# Patient Record
Sex: FEMALE | Race: OTHER | URBAN - METROPOLITAN AREA
[De-identification: names, ages, dates, MRNs, and addresses within clinical notes are randomized per-mention and may not be internally consistent; named-entity substitution may affect disease eponyms.]

---

## 2024-09-16 ENCOUNTER — OFFICE VISIT (OUTPATIENT)
Dept: OBGYN CLINIC | Facility: CLINIC | Age: 29
End: 2024-09-16
Payer: COMMERCIAL

## 2024-09-16 ENCOUNTER — ULTRASOUND ENCOUNTER (OUTPATIENT)
Dept: OBGYN CLINIC | Facility: CLINIC | Age: 29
End: 2024-09-16
Payer: COMMERCIAL

## 2024-09-16 VITALS
BODY MASS INDEX: 36.96 KG/M2 | HEIGHT: 66 IN | DIASTOLIC BLOOD PRESSURE: 86 MMHG | SYSTOLIC BLOOD PRESSURE: 122 MMHG | WEIGHT: 230 LBS

## 2024-09-16 DIAGNOSIS — N92.6 MISSED PERIOD: ICD-10-CM

## 2024-09-16 DIAGNOSIS — N92.6 MISSED PERIOD: Primary | ICD-10-CM

## 2024-09-16 LAB
CONTROL LINE PRESENT WITH A CLEAR BACKGROUND (YES/NO): YES YES/NO
KIT LOT #: NORMAL NUMERIC
PREGNANCY TEST, URINE: POSITIVE

## 2024-09-16 NOTE — PROGRESS NOTES
[facility]  Obstetrics and Gynecology    Chief Complaint:   Chief Complaint   Patient presents with    Pregnancy       Subjective:     Mariana Fierro is a 29 year old ,female, Patient's last menstrual period was 2024 (exact date). presents with missed menses and positive pregnancy test.   This is an unexpected but desired pregnancy. Partner is involved.    Confirmation of pregnancy, surprise but desired  Jarrell andujar    Patient's last menstrual period was 2024 (exact date).  Monthly / regular, this was normal period    + nausea if gets hungry, vomits in the evening  + tired    + pnv    + urinary frequency  No problems with BM  + breast tenderness    Last pap smear  normal, no h/o abnormals  No h/o STI    Menarche: 12 yrs old (2024 10:12 AM)  Period Cycle (Days): every month (2024 10:12 AM)  Period Duration (Days): 5 days (2024 10:12 AM)  Period Flow: heavy first day then tapers off (2024 10:12 AM)  Use of Birth Control (if yes, specify type): OCP (2024 10:12 AM)  Date When Birth Control Last Used: last used 2 yrs ago (2024 10:12 AM)  Hx Prior Abnormal Pap: No (2024 10:12 AM)  Pap Date:  (pt states ) (2024 10:12 AM)  Pap Result Notes: WNL (2024 10:12 AM)      Review of Systems:  General: no complaints  Eyes: no complaints  Respiratory: no complaints  Cardiovascular: no complaints  GI: no complaints  : no complaints See HPI  Hematological/lymphatic: no complaints  Breast: no complaints  Psychiatric: no complaints  Endocrine:no complaints  Neurological: no complaints  Immunological: no complaints  Musculoskeletal:no complaints    OB History    Para Term  AB Living   1 0 0 0 0 0   SAB IAB Ectopic Multiple Live Births   0 0 0 0 0       Past Medical History:    Patient denies medical problems       Past Surgical History:   Procedure Laterality Date    Patient denies any surgical history         Social History      Socioeconomic History    Marital status: Single   Tobacco Use    Smoking status: Never    Smokeless tobacco: Never   Substance and Sexual Activity    Alcohol use: Not Currently    Drug use: Never    Sexual activity: Yes   Other Topics Concern    Blood Transfusions No       Family History   Problem Relation Age of Onset    High Cholesterol Father     Hypertension Father     High Cholesterol Mother     Hypertension Mother     Cancer Maternal Grandmother     Thyroid Cancer Paternal Grandmother     Diabetes Paternal Grandfather     No Known Problems Brother     No Known Problems Brother     No Known Problems Sister        No current outpatient medications on file.      Health maintenance:  Health Maintenance   Topic Date Due    Annual Physical  Never done    DTaP,Tdap,and Td Vaccines (1 - Tdap) Never done    Pap Smear  Never done    COVID-19 Vaccine (2 -  season) 2024    Influenza Vaccine (1) 10/01/2024    Annual Depression Screening  Completed    Pneumococcal Vaccine: Birth to 64yrs  Aged Out        Objective:     Vitals:    24 1007   BP: 122/86   Weight: 230 lb (104.3 kg)   Height: 66\"         Body mass index is 37.12 kg/m².    GENERAL: well developed, well nourished, in no apparent distress, alert and orientated X 3  PSYCH: mood and affect stable   SKIN: no rashes, no lesions  HEENT: normal  LUNGS: respiration unlabored  CARDIOVASCULAR: no peripheral edema or varicosities, skin warm and dry  ABDOMEN: Soft, non distended; non tender, no masses  Unable to hear FHT on handheld doppler  EXTREMITIES:  Nontender without edema    Labs:  POC urine pregnancy test: Positive           Assessment:     Mariana Fierro is a 29 year old  female who presents for missed menses and positive pregnancy test    There is no problem list on file for this patient.        Plan:       ICD-10-CM    1. Missed period  N92.6 Urine Preg Test [21351]     US OB 1st Trimester Abdominal <14 wks [89795]        Missed menses  -  positive pregnancy test  - US ordered   - Pt counseled on safety, diet, exercise, caffiene, tobacco, ETOH, sexual activity, ER precautions, diet, exercise, appropriate otc medication use, substance abuse   - Counseled on taking a PNV with folic acid   - genetic screening testing options reviewed.  - advised to follow up to establish prenatal care - referred for RN education visit  - SAB precautions provided   - d/w nausea and vomiting in pregnancy including vitamin B 6 and unisom   - flu/covid vaccines    All of the findings and plan were discussed with the patient.  She notes understanding and agrees with the plan of care.  All questions were answered to the best of my ability at this time.      RTC in 1-2 weeks for rn education appointment, or sooner if needed     DO LEN Byrnes

## 2024-09-17 ENCOUNTER — PATIENT MESSAGE (OUTPATIENT)
Dept: OBGYN CLINIC | Facility: CLINIC | Age: 29
End: 2024-09-17

## 2024-09-17 NOTE — TELEPHONE ENCOUNTER
From: Mariana Fierro  To: Ifrah Cisneros  Sent: 9/17/2024 7:51 AM CDT  Subject: Appointments needed    Hi Dr. Cisneros, hope you are doing well. I am just reaching out because I was wondering if I need to schedule any other appointment? I currently have the Nurse Seminar scheduled for September 30th as well as the 20 week anatomy scan scheduled for November 18th. Is that all that’s needed to be scheduled right now or do I need to schedule another doctors appointment?

## 2024-09-30 ENCOUNTER — NURSE ONLY (OUTPATIENT)
Dept: OBGYN CLINIC | Facility: CLINIC | Age: 29
End: 2024-09-30
Payer: COMMERCIAL

## 2024-09-30 ENCOUNTER — LAB ENCOUNTER (OUTPATIENT)
Dept: LAB | Facility: REFERENCE LAB | Age: 29
End: 2024-09-30
Attending: OBSTETRICS & GYNECOLOGY
Payer: COMMERCIAL

## 2024-09-30 DIAGNOSIS — Z34.01 ENCOUNTER FOR SUPERVISION OF NORMAL FIRST PREGNANCY IN FIRST TRIMESTER (HCC): ICD-10-CM

## 2024-09-30 DIAGNOSIS — Z34.01 ENCOUNTER FOR SUPERVISION OF NORMAL FIRST PREGNANCY IN FIRST TRIMESTER (HCC): Primary | ICD-10-CM

## 2024-09-30 LAB
ANTIBODY SCREEN: NEGATIVE
BASOPHILS # BLD AUTO: 0.03 X10(3) UL (ref 0–0.2)
BASOPHILS NFR BLD AUTO: 0.3 %
DEPRECATED RDW RBC AUTO: 41.6 FL (ref 35.1–46.3)
EOSINOPHIL # BLD AUTO: 0.24 X10(3) UL (ref 0–0.7)
EOSINOPHIL NFR BLD AUTO: 2.6 %
ERYTHROCYTE [DISTWIDTH] IN BLOOD BY AUTOMATED COUNT: 12.4 % (ref 11–15)
GLUCOSE 1H P GLC SERPL-MCNC: 128 MG/DL
HBV SURFACE AG SER-ACNC: <0.1 [IU]/L
HBV SURFACE AG SERPL QL IA: NONREACTIVE
HCT VFR BLD AUTO: 41 %
HGB BLD-MCNC: 14 G/DL
IMM GRANULOCYTES # BLD AUTO: 0.04 X10(3) UL (ref 0–1)
IMM GRANULOCYTES NFR BLD: 0.4 %
LYMPHOCYTES # BLD AUTO: 1.34 X10(3) UL (ref 1–4)
LYMPHOCYTES NFR BLD AUTO: 14.5 %
MCH RBC QN AUTO: 31.3 PG (ref 26–34)
MCHC RBC AUTO-ENTMCNC: 34.1 G/DL (ref 31–37)
MCV RBC AUTO: 91.7 FL
MONOCYTES # BLD AUTO: 0.46 X10(3) UL (ref 0.1–1)
MONOCYTES NFR BLD AUTO: 5 %
NEUTROPHILS # BLD AUTO: 7.12 X10 (3) UL (ref 1.5–7.7)
NEUTROPHILS # BLD AUTO: 7.12 X10(3) UL (ref 1.5–7.7)
NEUTROPHILS NFR BLD AUTO: 77.2 %
PLATELET # BLD AUTO: 226 10(3)UL (ref 150–450)
RBC # BLD AUTO: 4.47 X10(6)UL
RH BLOOD TYPE: POSITIVE
RUBV IGG SER QL: POSITIVE
RUBV IGG SER-ACNC: 18 IU/ML (ref 10–?)
T PALLIDUM AB SER QL IA: NONREACTIVE
WBC # BLD AUTO: 9.2 X10(3) UL (ref 4–11)

## 2024-09-30 PROCEDURE — 86803 HEPATITIS C AB TEST: CPT

## 2024-09-30 PROCEDURE — 36415 COLL VENOUS BLD VENIPUNCTURE: CPT

## 2024-09-30 PROCEDURE — 83020 HEMOGLOBIN ELECTROPHORESIS: CPT

## 2024-09-30 PROCEDURE — 87340 HEPATITIS B SURFACE AG IA: CPT

## 2024-09-30 PROCEDURE — 86762 RUBELLA ANTIBODY: CPT

## 2024-09-30 PROCEDURE — 85025 COMPLETE CBC W/AUTO DIFF WBC: CPT

## 2024-09-30 PROCEDURE — 86901 BLOOD TYPING SEROLOGIC RH(D): CPT

## 2024-09-30 PROCEDURE — 87086 URINE CULTURE/COLONY COUNT: CPT

## 2024-09-30 PROCEDURE — 82950 GLUCOSE TEST: CPT

## 2024-09-30 PROCEDURE — 86900 BLOOD TYPING SEROLOGIC ABO: CPT

## 2024-09-30 PROCEDURE — 86780 TREPONEMA PALLIDUM: CPT

## 2024-09-30 PROCEDURE — 83021 HEMOGLOBIN CHROMOTOGRAPHY: CPT

## 2024-09-30 PROCEDURE — 87389 HIV-1 AG W/HIV-1&-2 AB AG IA: CPT

## 2024-09-30 PROCEDURE — 86850 RBC ANTIBODY SCREEN: CPT

## 2024-09-30 RX ORDER — CHOLECALCIFEROL (VITAMIN D3) 25 MCG
1 TABLET,CHEWABLE ORAL DAILY
COMMUNITY

## 2024-10-01 LAB — HCV AB SERPL QL IA: NONREACTIVE

## 2024-10-02 LAB
HGB A2 MFR BLD: 2.5 % (ref 1.5–3.5)
HGB PNL BLD ELPH: 97.5 % (ref 95.5–100)

## 2024-10-08 ENCOUNTER — TELEPHONE (OUTPATIENT)
Dept: OBGYN CLINIC | Facility: CLINIC | Age: 29
End: 2024-10-08

## 2024-10-17 ENCOUNTER — LAB ENCOUNTER (OUTPATIENT)
Dept: LAB | Age: 29
End: 2024-10-17
Attending: OBSTETRICS & GYNECOLOGY
Payer: COMMERCIAL

## 2024-10-17 ENCOUNTER — ROUTINE PRENATAL (OUTPATIENT)
Dept: OBGYN CLINIC | Facility: CLINIC | Age: 29
End: 2024-10-17
Payer: COMMERCIAL

## 2024-10-17 VITALS
DIASTOLIC BLOOD PRESSURE: 78 MMHG | BODY MASS INDEX: 37.77 KG/M2 | WEIGHT: 235 LBS | SYSTOLIC BLOOD PRESSURE: 116 MMHG | HEIGHT: 66 IN

## 2024-10-17 DIAGNOSIS — Z34.00 SUPERVISION OF NORMAL FIRST PREGNANCY, ANTEPARTUM (HCC): ICD-10-CM

## 2024-10-17 DIAGNOSIS — Z34.00 SUPERVISION OF NORMAL FIRST PREGNANCY, ANTEPARTUM (HCC): Primary | ICD-10-CM

## 2024-10-17 PROCEDURE — 36415 COLL VENOUS BLD VENIPUNCTURE: CPT

## 2024-10-17 PROCEDURE — 82105 ALPHA-FETOPROTEIN SERUM: CPT

## 2024-10-19 LAB
AFP MOM: 1.56
AFP VALUE: 39.5 NG/ML
GA ON COLL DATE: 16 WEEKS
INSULIN DEP AFP: NO
MAT AGE AT EDD: 28.9 YR
MULTIPLE GEST AFP: NO
OSBR RISK 1 IN AFP: 2331
WEIGHT AFP: 235 LBS

## 2024-10-31 ENCOUNTER — TELEPHONE (OUTPATIENT)
Dept: OBGYN CLINIC | Facility: CLINIC | Age: 29
End: 2024-10-31

## 2024-11-20 ENCOUNTER — ROUTINE PRENATAL (OUTPATIENT)
Dept: OBGYN CLINIC | Facility: CLINIC | Age: 29
End: 2024-11-20
Payer: COMMERCIAL

## 2024-11-20 ENCOUNTER — ULTRASOUND ENCOUNTER (OUTPATIENT)
Dept: OBGYN CLINIC | Facility: CLINIC | Age: 29
End: 2024-11-20
Payer: COMMERCIAL

## 2024-11-20 VITALS
WEIGHT: 235.69 LBS | BODY MASS INDEX: 37.88 KG/M2 | SYSTOLIC BLOOD PRESSURE: 108 MMHG | DIASTOLIC BLOOD PRESSURE: 70 MMHG | HEIGHT: 66 IN

## 2024-11-20 DIAGNOSIS — Z34.00 SUPERVISION OF NORMAL FIRST PREGNANCY, ANTEPARTUM (HCC): Primary | ICD-10-CM

## 2024-11-20 DIAGNOSIS — Z34.02 ENCOUNTER FOR SUPERVISION OF NORMAL FIRST PREGNANCY IN SECOND TRIMESTER (HCC): Primary | ICD-10-CM

## 2024-11-20 PROCEDURE — 76805 OB US >/= 14 WKS SNGL FETUS: CPT | Performed by: OBSTETRICS & GYNECOLOGY

## 2024-11-20 RX ORDER — ASPIRIN 81 MG/1
81 TABLET ORAL DAILY
COMMUNITY

## 2024-11-20 NOTE — PROGRESS NOTES
RETURN OB VISIT    Mariana Fierro is a 29 year old  at 20w6d presenting for return OB visit. Today she reports no cramping, bleeding or abnormal discharge. She does reports some hypertrophy of her gums along her bottom front teeth, sometimes this area bleeds with brushing but is not painful. Has not seen her dentist recently.     Normal anatomy US reviewed  Plan for 1hr GTT at next visit  Flu shot - declines today, states she has never had a flu shot. Discussed ACOG recommendation for annual flu vaccination, discussed increased risk of serious illness and hospitalization. Patient will think about it for next visit.   Discussed gingival disease is common in pregnancy, recommend seeing her dentist if bleeding persists or gums cause discomfort.     Follow up in 4wk    Luz Marina Valentine,    Writer contacted Dr. Mikki Pérez to inform of 30 day readmission risk. Writer's attempt to contact Dr. Mikki Pérez was unsuccessful.

## 2024-11-29 ENCOUNTER — HOSPITAL ENCOUNTER (OUTPATIENT)
Facility: HOSPITAL | Age: 29
Discharge: HOME | End: 2024-11-29
Attending: OBSTETRICS & GYNECOLOGY | Admitting: OBSTETRICS & GYNECOLOGY
Payer: COMMERCIAL

## 2024-11-29 ENCOUNTER — TELEPHONE (OUTPATIENT)
Dept: OBGYN CLINIC | Facility: CLINIC | Age: 29
End: 2024-11-29

## 2024-11-29 ENCOUNTER — PATIENT MESSAGE (OUTPATIENT)
Dept: OBGYN CLINIC | Facility: CLINIC | Age: 29
End: 2024-11-29

## 2024-11-29 VITALS — HEART RATE: 80 BPM | SYSTOLIC BLOOD PRESSURE: 125 MMHG | DIASTOLIC BLOOD PRESSURE: 78 MMHG

## 2024-11-29 LAB
BILIRUB UR QL: NEGATIVE
CLARITY UR: CLEAR
COLOR UR: COLORLESS
GLUCOSE UR-MCNC: NORMAL MG/DL
HGB UR QL STRIP.AUTO: NEGATIVE
KETONES UR-MCNC: NEGATIVE MG/DL
LEUKOCYTE ESTERASE UR QL STRIP.AUTO: NEGATIVE
NITRITE UR QL STRIP.AUTO: NEGATIVE
PH UR: 7 [PH] (ref 5–8)
PROT UR-MCNC: NEGATIVE MG/DL
SP GR UR STRIP: 1.01 (ref 1–1.03)
UROBILINOGEN UR STRIP-ACNC: NORMAL

## 2024-11-29 PROCEDURE — 99204 OFFICE O/P NEW MOD 45 MIN: CPT

## 2024-11-29 PROCEDURE — 81003 URINALYSIS AUTO W/O SCOPE: CPT | Performed by: OBSTETRICS & GYNECOLOGY

## 2024-11-29 NOTE — TRIAGE
[facility]  part of [facility]      Triage Note    Mariana Fierro Patient Status:  Outpatient    1995 MRN [id number]   Location [facility] Attending Maribel Carias MD   Hosp Day # 0 PCP No primary care provider on file.      Para:   Estimated Date of Delivery: 4/3/25  Gestation: 22w1d    Chief Complaint    R/o Rom         Allergies:  Allergies[1]    Orders Placed This Encounter   Procedures    Urinalysis with Culture Reflex    POCT Ferning       Lab Results   Component Value Date    WBC 9.2 2024    HGB 14.0 2024    HCT 41.0 2024    .0 2024       Clinitek UA  Lab Results   Component Value Date    URINECUL No Growth at 18-24 hrs. 2024       UA  No results found for: \"COLORUR\", \"CLARITY\", \"SPECGRAVITY\", \"PROUR\", \"GLUUR\", \"KETUR\", \"BILUR\", \"BLOODURINE\", \"NITRITE\", \"UROBILINOGEN\", \"LEUUR\", \"UASA\"    Vitals:    24 1330   BP: 125/78   Pulse: 80       NST                                       Baseline: 150 BPM (doppler)                                                                                                                 Additional Comments Comments: Pt is , 22.1 weeks, presenting with report of possible LOF while voiding around 1200. Pt denies LOF or vaginal bleeding. Ferning and amniotest negative, no fluid noted on SSE. UA sent. Dr Carias reviewed case, discharge order received. Pt instructed on PTL precautions. Pt denies questions at this time. Pt home ambulatory.     Chief Complaint   Patient presents with    R/o Rom     Pt reports \"a lot of fluid coming out\" when she was voiding around 1200 today.         Ellen MELARA RN  2024 2:02 PM         [1]   Allergies  Allergen Reactions    Seasonal SHORTNESS OF BREATH and WHEEZING

## 2024-11-29 NOTE — TELEPHONE ENCOUNTER
Received a call from patient to notified that when she used the bathroom this morning ,  she noticed a clear liquid coming out instead of urine.  Patient is requesting to be seen today or speak with a nurse .    Please assist .

## 2024-11-29 NOTE — PROGRESS NOTES
Pt is a 29 year old female admitted to [facility].     Chief Complaint   Patient presents with    R/o Rom     Pt reports \"a lot of fluid coming out\" when she was voiding around 1200 today.      Pt is  22w1d intra-uterine pregnancy.  History obtained, consents signed. Oriented to room, staff, and plan of care.

## 2024-11-29 NOTE — TELEPHONE ENCOUNTER
Patient is calling again for update, per patient wants to know where should she go to be check out if ED or a different department. Please follow up with patient.

## 2024-11-29 NOTE — TELEPHONE ENCOUNTER
Called pt unsure if leaking bag of water or urine believes it is not urine and occurred for a while, denies cramping/contx and vaginal bleeding.  Advised to go to [facility], agrees.    Called [facility] CADE informed.    Dr. Carias informed.

## 2024-12-18 ENCOUNTER — PATIENT MESSAGE (OUTPATIENT)
Dept: OBGYN CLINIC | Facility: CLINIC | Age: 29
End: 2024-12-18

## 2024-12-18 ENCOUNTER — LAB ENCOUNTER (OUTPATIENT)
Dept: LAB | Age: 29
End: 2024-12-18
Attending: OBSTETRICS & GYNECOLOGY
Payer: COMMERCIAL

## 2024-12-18 ENCOUNTER — ROUTINE PRENATAL (OUTPATIENT)
Dept: OBGYN CLINIC | Facility: CLINIC | Age: 29
End: 2024-12-18
Payer: COMMERCIAL

## 2024-12-18 VITALS
WEIGHT: 241.19 LBS | SYSTOLIC BLOOD PRESSURE: 112 MMHG | BODY MASS INDEX: 38.76 KG/M2 | HEIGHT: 66 IN | DIASTOLIC BLOOD PRESSURE: 72 MMHG

## 2024-12-18 DIAGNOSIS — O99.810 ABNORMAL MATERNAL GLUCOSE TOLERANCE, ANTEPARTUM (HCC): Primary | ICD-10-CM

## 2024-12-18 DIAGNOSIS — Z34.02 ENCOUNTER FOR SUPERVISION OF NORMAL FIRST PREGNANCY IN SECOND TRIMESTER (HCC): Primary | ICD-10-CM

## 2024-12-18 DIAGNOSIS — Z34.00 SUPERVISION OF NORMAL FIRST PREGNANCY, ANTEPARTUM (HCC): ICD-10-CM

## 2024-12-18 LAB — GLUCOSE 1H P GLC SERPL-MCNC: 150 MG/DL

## 2024-12-18 PROCEDURE — 36415 COLL VENOUS BLD VENIPUNCTURE: CPT

## 2024-12-18 PROCEDURE — 82950 GLUCOSE TEST: CPT

## 2024-12-18 NOTE — PROGRESS NOTES
Test results viewed by patient via my chart.    Seen by patient Mariana Fierro on 12/18/2024  1:29 PM

## 2024-12-18 NOTE — PROGRESS NOTES
Doing well. No OB complaints. +FM.   Reviewed questions.   Reviewed Baby mindfulness at 28 weeks.   1h GTT today.     MIGUEL ANGEL 2 weeks.     Dr. Armando Frazier MD    [facility]     This note was created by Dragon voice recognition. Errors in content may be related to improper recognition by the system; efforts to review and correct have been done but errors may still exist. Please be advised the primary purpose of this note is for me to communicate medical care. Standard sentence structure is not always used. Medical terminology and medical abbreviations may be used. There may be grammatical, typographical, and automated fill ins that may have errors missed in proofreading.

## 2024-12-21 ENCOUNTER — LABORATORY ENCOUNTER (OUTPATIENT)
Dept: LAB | Age: 29
End: 2024-12-21
Attending: OBSTETRICS & GYNECOLOGY
Payer: COMMERCIAL

## 2024-12-21 DIAGNOSIS — O99.810 ABNORMAL MATERNAL GLUCOSE TOLERANCE, ANTEPARTUM (HCC): ICD-10-CM

## 2024-12-21 LAB
GLUCOSE 1H P GLC SERPL-MCNC: 163 MG/DL
GLUCOSE 2H P GLC SERPL-MCNC: 143 MG/DL
GLUCOSE 3H P GLC SERPL-MCNC: 77 MG/DL (ref 70–140)
GLUCOSE P FAST SERPL-MCNC: 90 MG/DL

## 2024-12-21 PROCEDURE — 82951 GLUCOSE TOLERANCE TEST (GTT): CPT

## 2024-12-21 PROCEDURE — 36415 COLL VENOUS BLD VENIPUNCTURE: CPT

## 2024-12-21 PROCEDURE — 82952 GTT-ADDED SAMPLES: CPT

## 2024-12-23 PROBLEM — O99.210 OBESITY AFFECTING PREGNANCY, ANTEPARTUM (HCC): Status: ACTIVE | Noted: 2024-12-23

## 2025-01-09 ENCOUNTER — ROUTINE PRENATAL (OUTPATIENT)
Dept: OBGYN CLINIC | Facility: CLINIC | Age: 30
End: 2025-01-09
Payer: COMMERCIAL

## 2025-01-09 VITALS
WEIGHT: 240.63 LBS | SYSTOLIC BLOOD PRESSURE: 116 MMHG | HEIGHT: 66 IN | DIASTOLIC BLOOD PRESSURE: 74 MMHG | BODY MASS INDEX: 38.67 KG/M2

## 2025-01-09 DIAGNOSIS — O99.210 OBESITY AFFECTING PREGNANCY, ANTEPARTUM, UNSPECIFIED OBESITY TYPE (HCC): ICD-10-CM

## 2025-01-09 DIAGNOSIS — Z34.00 SUPERVISION OF NORMAL FIRST PREGNANCY, ANTEPARTUM (HCC): Primary | ICD-10-CM

## 2025-01-09 PROCEDURE — 90715 TDAP VACCINE 7 YRS/> IM: CPT | Performed by: OBSTETRICS & GYNECOLOGY

## 2025-01-09 PROCEDURE — 90471 IMMUNIZATION ADMIN: CPT | Performed by: OBSTETRICS & GYNECOLOGY

## 2025-01-09 NOTE — PATIENT INSTRUCTIONS
Patient education: Should I have my baby circumcised? (The Basics)  Written by the doctors and editors at [facility]  Please read the Disclaimer at the end of this page.  What is circumcision?   ?ir??m?i?io? is surgery to remove the skin that covers the tip of the penis, called the \"foreskin\" (figure 1). ?ir??m?i?i?n is usually done when a baby boy is between 1 and 10 days old. It is more common in some parts of the world than others. ?ir??m?i?i?? is also a common tradition in some religions.  Should I have my baby circumcised?   There is no easy answer. Different people feel differently about this decision.  ?ir??m?ision has some benefits. But it also has some risks. After talking with your baby's doctor, you will have to decide for yourself what is right for your family.  What are the benefits of circumcision?   Circumcised boys seem to have slightly lower rates of:  ?Urinary tract infections  ?Swelling of the opening at the tip of the penis  As adults, circumcised males seem to have slightly lower rates of:  ?Urinary tract infections  ?Swelling of the opening at the tip of the penis  ?Penis cancer  ?HIV and other infections you can catch during sex  ?Cervical cancer in their sex partners  Even so, the risks of these problems are small, even in people who have not been circumcised. Also, people who are not circumcised can lower these extra risks by:  ?Cleaning their penis well  ?Using condoms during sex  What are the risks of circumcision?   Risks include:  ?Bleeding or infection from the surgery  ?Damage to the penis  ?A chance that the doctor will cut off too much or not enough of the foreskin  ?A chance that sex won't feel as good later in life  Only about 1 out of every 200 circumcisions leads to problems. Also, some health insurances won't pay for ?ir??m?jw?n.  How is circumcision done in babies?   The baby will get medicine so they don't feel pain. This might be a cream on their skin or a shot into the base  of their penis. The doctor will clean the baby's penis well. Then, they will use special tools to cut off the foreskin. The doctor will wrap a gauze bandage around the baby's penis.  If you have your baby circumcised, the doctor or nurse will give you instructions on how to care for the baby after surgery. Follow those instructions carefully.  More on this topic   Patient education: Circumcision in babies (The Basics)  Patient education: Care of the uncircumcised penis in babies and children (The Basics)  Patient education: Circumcision in baby boys (Beyond the Basics)  All topics are updated as new evidence becomes available and our peer review process is complete.  This topic retrieved from [employer] on: Jan 09, 2025.  Disclaimer: This generalized information is a limited summary of diagnosis, treatment, and/or medication information. It is not meant to be comprehensive and should be used as a tool to help the user understand and/or assess potential diagnostic and treatment options. It does NOT include all information about conditions, treatments, medications, side effects, or risks that may apply to a specific patient. It is not intended to be medical advice or a substitute for the medical advice, diagnosis, or treatment of a health care provider based on the health care provider's examination and assessment of a patient's specific and unique circumstances. Patients must speak with a health care provider for complete information about their health, medical questions, and treatment options, including any risks or benefits regarding use of medications. This information does not endorse any treatments or medications as safe, effective, or approved for treating a specific patient. UpToDate, Inc. and its affiliates disclaim any warranty or liability relating to this information or the use thereof. The use of this information is governed by the Terms of Use, available at  https://www.woltersTopTechPhotouwer.com/en/know/clinical-effectiveness-terms. 2025© UpToDate, Inc. and its affiliates and/or licensors. All rights reserved.  Topic 63541 Version 11.0  GRAPHICS  Circumcision    Circumcision is a surgery to remove the skin that covers the tip of the penis, called the \"foreskin.\"  (A) Uncircumcised penis.  (B) Circumcised penis.    atient education: Circumcision in baby boys (Beyond the Basics)  Author:  Camille Walton MD, FAAP  Section Editors:  Luan Flynn MD, [place]  Jeremiah Bliss MD  [place] :  Annabella Nascimento MD, [facility]    All topics are updated as new evidence becomes available and our peer review process is complete.  Literature review current through: Dec 2024.  This topic last updated: Apr 11, 2023.  Please read the Disclaimer at the end of this page.  CIRCUMCISION OVERVIEW   ?ir??m?i?i?n in the male is the removal of the f?r??ki? of the penis. The practice of ?ir??m?jw?n dates to ancient times. In ancient [place], prior to biblical times, ?ir??m?i?io? was performed to improve male hygiene. Later, routine ?ir??m?jw?? of male infa?ts was part of the Abrahamic covenants with Jude, giving rise to Congregational circumcisions that continue to this day in the Christian and Judaism faiths.  ?ir??m?jw?? rates in the [place] vary according to geographic area, socioeconomic status, Congregational affiliation, insurance coverage, hospital type, and racial and ethnic group. The incidence in 2008 was approximately 55 to 57 percent based on hospital coding data, but this is probably an underestimate of the true incidence of circumcised males, which is likely closer to 80 percent, due to miscoding and because some circumcisions are performed after hospital discharge or later in life for Congregational, medical, or personal reasons [1]. Based on coding data, ?ir??m?i?i?n rates are highest in the [place] states (74 percent), followed by the Northeastern states (67 percent) and Southern states  (61 percent), and are lowest in the [facility] (30 percent).  There are no studies that give reliable data about the number of males who are circumcised after birth for elective or surgical indications. An [place] survey found that approximately 18 percent of males who were not circumcised as inf?nts reported that they were circumcised subsequently [2,3].  NORMAL PENILE DEVELOPMENT AND HYGIENE   At birth, the f?re?ki?, also called the ?r??u?e, is attached to the end of the penis, an area known as the glans (picture 1). Over time, the f?reski? separates from the glans, forming a space between the skin and the glans. Separation is completed in 50 percent of boys by age 3 years, 95 percent by age 5 years, and 99 percent by adolescence. In a small number of uncircumcised males, partial adhesions leading to accumulation of smegma may persist throughout childhood, and even into adolescence.  Care of an uncircumcised penis -- The f?re?ki? should never be forcibly pulled back when there is resistance. Forcibly retracting the f?r??ki? while it is still attached to the glans could cause injury.  The uncircumcised penis is generally easy to keep clean. Parents of an infant should gently wash the genital area while bathing. Later, when the f?r??kin is fully retractable, boys should be taught the importance of washing beneath the for??ki? on a regular basis. The f?reskin should be dried before pulling it forward.  BENEFITS OF CIRCUMCISION   There are several medical benefits to male ?ir??m?jw?n. However, factors other than ?ir??m?i?io? (eg, number of sexual partners, use of condoms, human papillomavirus [HPV] immunization, penile hygiene) are probably much more important risk factors for penile medical disorders than not being circumcised.  Reduction in urinary tract infection -- Urinary tract infections (UTIs) are uncommon in males; the greatest risk is in male infant? less than one year old. Studies consistently report  that uncircumcised male i?fa?t? are at higher risk of UTI compared with circumcised male infant?. UTIs in i?f??ts can result in kidney infection requiring hospitalization and, rarely, severe infection and death. If the urinary tract is normal, long-term sequelae from UTI are unlikely.  Cancer -- Cancer of the penis is rare, but uncircumcised men are at increased risk for developing the disease. Good hygiene and HPV immunization may reduce or negate this risk.  Cervical cancer is more common in women whose male sexual partners are not circumcised. HPV immunization may reduce or negate this risk.  Penile problems -- Uncircumcised males are at increased risk for inflammation of the glans; this problem rarely occurs in circumcised men, as well. Uncircumcised boys who practice good penile hygiene are less likely to experience penile inflammation.  Infection -- Studies suggest that ?ir??m?i?i?n helps decrease the risk of acquisition of human immunodeficiency virus (HIV), HPV, and probably herpes simplex virus type 2 (HSV-2), and also some evidence that it may protect against trichomonas and chancroid infection. ?ir??m?i?i?n does not protect against infection from gonorrhea, chlamydia trachomatis, or syphilis. It is important to note, however, that many circumcised men acquire these diseases. ?ir??m?i?i?n may lower the risk of acquiring the infection, but it does not eliminate it.  Hygiene -- In the uncircumcised male, the space between the f?r??kin and the glans must be cleaned regularly. Proponents of ?ir??m?i?i?? argue that it is difficult for uncircumcised boys and men to maintain proper hygiene.  ADVERSE EFFECTS OF CIRCUMCISION   Procedural risks -- An accurate complication rate is difficult to determine because the largest studies are based on coding diagnoses and inconsistent definitions. In addition, data have generally not been stratified to account for timing of the procedure, technique, provider type, setting,  length of follow-up, timing of complications, and severity of complications.  ?In two studies that included a total of over 200,000 circumcisions performed in [facility] hospitals, the rate of complications during and in the first month after the procedure was approximately 0.2 percent [4,5].  ?A systematic review identified 16 prospective studies of complications following ??o?atal and infant ?ir??m?i?io? by a variety of providers from 12 countries and primarily using the Plastibell [6]. The median frequency of any adverse event was 1.5 percent (range 0 to 16 percent) and the median frequency of any serious adverse event was 0 percent (range 0 to 2 percent); nine studies reported no serious adverse events, but three studies reported that 1 to 2 percent of boys had a serious complication, including amputation of the glans penis, infection requiring antibiotics and meatal ulcer. Complication rates were slightly lower in 10 retrospective studies.  Complications/sequelae of ?ir??m?i?io? include:  ?Inadequate skin removal, which may result in an unsatisfactory cosmetic appearance and revision of the procedure. This is a common complaint, although the frequency is poorly documented in the literature.  ?Bleeding, which is usually mild and controlled with local pressure, but surgical intervention and transfusion may be required on rare occasions.  ?Infection, which is usually mild and treated by local antibiotics, but sepsis can occur and death has been reported.  ?Urethral complications, including urethrocutaneous fistula and meatal stenosis. Meatal stenosis is a potential consequence of ?ir??m?i?i?n but is not related to the procedure itself. This can occur when urine from a wet diaper irritates the exposed ventral urethral meatus (opening) of the circumcised penis and causes a chemical dermatitis (skin inflammation) with subsequent scarring. Meatal stenosis rarely if ever occurs in uncircumcised males since the  f?re?kin protects the meatus from scarring.  ?Glans injury, including penile amputation.  ?Removal of excessive skin, which may result in a denuded penile shaft.  ?Epidermal inclusion cyst (retained skin that gets buried and continues to grow).  ?Adhesions, which range from mild to dense.  ?Skin bridges.  ?Cicatrix (a circumferential scar that usually develops at the incision line and is often associated with a hidden penis).  ?Complications from anesthesia.  Other considerations -- The ?r??uce contains sensory nerve tissue that is removed during ?ir??m?i?i?n. Some men believe that the end of the penis becomes less sensitive when the f?re?ki? is removed and that sexual sensation may be decreased. However, most circumcised males do not describe psychological trauma or decreased sexual function or desire as a result of the procedure.  Parents should be aware that some health plans do not cover the cost of ?ir??m?isio?. Parents should call their health plan directly to find out if the procedure is covered.  PAIN CONTROL DURING CIRCUMCISION   Studies in ???bor?s have shown that signs of stress/pain occur during the ?ir??m?i?i?? procedure. These include crying, increased heart rate, and increased blood pressure.  Parents should discuss what pain control measures will be used before their child is circumcised. Swaddling, oral sugar solutions, or acetaminophen may be given as well, but should not be used as the primary method of pain relief.  CIRCUMCISION PREPARATION AND PROCEDURE   Before ?ir??m?i?i?n, the doctor who will perform the procedure will review the informed consent. This is a discussion of the reasons for ?ir??m?i?ion, the benefits, risks, and alternatives, and ensures that the parents understand what will happen during the procedure.  There are a few situations that may cause a ?ir??m?jw?n to be delayed. For example, in babies who are born prematurely, ?ir??m?i?i?n is usually delayed until they are ready to  be discharged from the hospital. Babies who are born with a defect of the penis should be evaluated by a urologist, who may recommend delaying ?ir??m?jw?n. If there is a family history of a bleeding disorder or the baby has bleeding problems, ?ir??m?jw?? is delayed until it has been determined that the baby is not at increased risk of bleeding during the procedure.  Technique -- The infant is placed in a restraint (picture 2). The penis and an area of skin around the base of the penis are thoroughly cleaned.  There are several techniques for performing ?ir??m?i?i??; the choice of which technique is used depends upon the physician's preference and experience. The three major methods of ?ir??m?i?io? are the Gomco clamp, the Plastibell device, and the Mogen clamp. The procedure takes approximately 15 to 30 minutes.  Post-procedure care -- After the ?ir??m?ision is completed, a gauze dressing is usually applied (picture 3A-B). Use of a lubricant under the gauze helps to prevent it from sticking to the glans. The gauze should be removed and replaced with every diaper change for 24 hours. The ?ir??m?jw?? site is cleaned with warm water and a cotton ball once or twice a day. Normally the infant urinates within 12 hours of the procedure.  After the first 24 hours, the gauze is omitted and the lubricant is applied directly to the penis for three to five days. This helps keep the area clean and keeps the wound site from adhering to the diaper. At first, the penis will appear red (picture 4). In a few days, a soft yellow scab will develop. This is normal and will go away in a few days. During this process, parents should watch for worsening redness, swelling, bleeding (larger than a quarter-size on the diaper) or drainage that does not go away. Any of these signs should prompt a call to the infant's health care provider.  Usually the penis needs no further care once it has healed.  MAKING A DECISION ABOUT CIRCUMCISION    Making the decision to circumcise an infant can be difficult for some parents. A father may be concerned that his son's penis appear similar to himself or to other men. Some parents may be concerned about the risks versus the benefits of the procedure. Other parents have no difficulty making a decision because of cultural or Mormonism rules that require ?ir??m?i?i??.  A decision is best made before the baby is born, although parents should feel comfortable discussing their questions or concerns with their health care provider after the child's birth. The procedure can be performed at the hospital before the mother and baby are discharged, or can be performed as an outpatient procedure with local anesthesia as late as three months after birth. After three months, the procedure usually requires general anesthesia.  WHERE TO GET MORE INFORMATION   Your child's health care provider is the best source of information for questions and concerns related to your child's medical problem.  This article will be updated as needed every on our Web site ([email or web address][email or web address]). Related topics for patients, as well as selected articles written for health care professionals, are also available. Some of the most relevant are listed below.  Patient level information -- [employer] offers two types of patient education materials.  The Basics -- The Basics patient education pieces answer the four or five key questions a patient might have about a given condition. These articles are best for patients who want a general overview and who prefer short, easy-to-read materials.  Patient education: Should I have my baby circumcised? (The Basics)  Patient education: Circumcision in babies (The Basics)  Patient education: Care of the uncircumcised penis in babies and children (The Basics)  Beyond the Basics -- Beyond the Basics patient education pieces are longer, more sophisticated, and more detailed. These articles are best for patients who  want in-depth information and are comfortable with some medical jargon.  This topic currently has no corresponding Beyond the Basics content.  Professional level information -- Professional level articles are designed to keep doctors and other health professionals up-to-date on the latest medical findings. These articles are thorough, long, and complex, and they contain multiple references to the research on which they are based. Professional level articles are best for people who are comfortable with a lot of medical terminology and who want to read the same materials their doctors are reading.   circumcision: Risks and benefits   circumcision: Techniques  Complications of circumcision  Care and complications of the uncircumcised penis in infants and children  The following organizations also provide reliable health information.  ?[employer] of [employer]       (www.nlm.nih.gov/medlineplus/circumcision.html, available in many languages)  Use of UpToDate is subject to the Terms of Use.  REFERENCES  Bernadette S, Merrick M, Niya E, et al. Male circumcision. Pediatrics 2012; 130:e756.  ALBANIA Velasquez. Circumcision. What you think. [employer] 1989; 2:10.  ALBANIA Velasquez. The great circumcision report part 2. [employer] 1989; 2:4.  Fco TE, Kit DW. Risks from circumcision during the first month of life compared with those for uncircumcised boys. Pediatrics 1989; 83:1011.  Whit DA, Joseph E, Zerr DM, et al. A trade-off analysis of routine  circumcision. Pediatrics 2000; 105:246.  Jimmy BATES, Rupinder N, Samantha D, Lenin I. Complications of circumcision in male neonates, infants and children: a systematic review. BMC Urol 2010; 10:2.    Disclaimer: This generalized information is a limited summary of diagnosis, treatment, and/or medication information. It is not meant to be comprehensive and should be used as a tool to help the user understand and/or assess potential diagnostic and  treatment options. It does NOT include all information about conditions, treatments, medications, side effects, or risks that may apply to a specific patient. It is not intended to be medical advice or a substitute for the medical advice, diagnosis, or treatment of a health care provider based on the health care provider's examination and assessment of a patient's specific and unique circumstances. Patients must speak with a health care provider for complete information about their health, medical questions, and treatment options, including any risks or benefits regarding use of medications. This information does not endorse any treatments or medications as safe, effective, or approved for treating a specific patient. UpToDate, Inc. and its affiliates disclaim any warranty or liability relating to this information or the use thereof. The use of this information is governed by the Terms of Use, available at https://www.[employer].com/en/know/clinical-effectiveness-terms. 2025© UpToDate, Inc. and its affiliates and/or licensors. All rights reserved.  Topic 6721 Version 27.0  GRAPHICS  Uncircumcised penis    Courtesy of Brennen Baca MD.  Graphic 98452 Version 1.0  Baby restraint for circumcision procedures    Graphic 24380 Version 2.0  Petroleum gauze    Courtesy of Brennen Baca MD.  Graphic 73331 Version 1.0  Circumcised penis wrapped in petroleum gauze dressing    Courtesy of Brennen Baca MD.  Graphic 81594 Version 1.0  Circumcised penis    Courtesy of Brennen Baca MD.  Graphic 01925 Version 1.0  Contributor Disclosures  Camille Walton MD, FAAPNo relevant financial relationship(s) with ineligible companies to disclose.Luan Flynn MD, [facility] relevant financial relationship(s) with ineligible companies to disclose.Jeremiah Bliss MDNo relevant financial relationship(s) with ineligible companies to disclose.Annabella Nascimento MD, [facility] relevant financial relationship(s) with  ineligible companies to disclose.  Contributor disclosures are reviewed for conflicts of interest by the editorial group. When found, these are addressed by vetting through a multi-level review process, and through requirements for references to be provided to support the content. Appropriately referenced content is required of all authors and must conform to UpToDate standards of evidence.  Conflict of interest policy

## 2025-01-09 NOTE — PROGRESS NOTES
Denies pain, bleeding, LOF  Good fetal movement     29 year old  at 28w0d     Return OB  Pre-Gaye Care: UTD. Tdap today    Info on circumcision provided   Patient Active Problem List   Diagnosis    Supervision of normal first pregnancy, antepartum (HCC)    Obesity affecting pregnancy, antepartum (HCC)       - Return to clinic in: 2

## 2025-01-23 ENCOUNTER — ROUTINE PRENATAL (OUTPATIENT)
Dept: OBGYN CLINIC | Facility: CLINIC | Age: 30
End: 2025-01-23
Payer: COMMERCIAL

## 2025-01-23 VITALS
HEIGHT: 66 IN | WEIGHT: 245 LBS | SYSTOLIC BLOOD PRESSURE: 108 MMHG | BODY MASS INDEX: 39.37 KG/M2 | DIASTOLIC BLOOD PRESSURE: 84 MMHG

## 2025-01-23 DIAGNOSIS — Z34.00 SUPERVISION OF NORMAL FIRST PREGNANCY, ANTEPARTUM (HCC): Primary | ICD-10-CM

## 2025-01-23 NOTE — PROGRESS NOTES
Denies pain, bleeding, LOF  Good fetal movement     29 year old  at 30w0d     Return OB  Pre-Gaye Care: UTD.   - has growth US scheduled .    Patient Active Problem List   Diagnosis    Supervision of normal first pregnancy, antepartum (HCC)    Obesity affecting pregnancy, antepartum (HCC)       - Return to clinic in: 2 wks    Ifrah Calero DO

## 2025-02-06 ENCOUNTER — ULTRASOUND ENCOUNTER (OUTPATIENT)
Dept: OBGYN CLINIC | Facility: CLINIC | Age: 30
End: 2025-02-06
Payer: COMMERCIAL

## 2025-02-06 ENCOUNTER — ROUTINE PRENATAL (OUTPATIENT)
Dept: OBGYN CLINIC | Facility: CLINIC | Age: 30
End: 2025-02-06
Payer: COMMERCIAL

## 2025-02-06 VITALS
WEIGHT: 247 LBS | DIASTOLIC BLOOD PRESSURE: 70 MMHG | HEIGHT: 66 IN | SYSTOLIC BLOOD PRESSURE: 114 MMHG | BODY MASS INDEX: 39.7 KG/M2

## 2025-02-06 DIAGNOSIS — Z34.03 ENCOUNTER FOR SUPERVISION OF NORMAL FIRST PREGNANCY IN THIRD TRIMESTER (HCC): Primary | ICD-10-CM

## 2025-02-06 RX ORDER — FAMOTIDINE 20 MG/1
20 TABLET, FILM COATED ORAL 2 TIMES DAILY PRN
Qty: 60 TABLET | Refills: 3 | Status: SHIPPED | OUTPATIENT
Start: 2025-02-06

## 2025-02-20 ENCOUNTER — ROUTINE PRENATAL (OUTPATIENT)
Dept: OBGYN CLINIC | Facility: CLINIC | Age: 30
End: 2025-02-20
Payer: COMMERCIAL

## 2025-02-20 VITALS
WEIGHT: 251 LBS | HEIGHT: 66 IN | SYSTOLIC BLOOD PRESSURE: 110 MMHG | BODY MASS INDEX: 40.34 KG/M2 | DIASTOLIC BLOOD PRESSURE: 76 MMHG

## 2025-02-20 DIAGNOSIS — Z34.03 ENCOUNTER FOR SUPERVISION OF NORMAL FIRST PREGNANCY IN THIRD TRIMESTER (HCC): Primary | ICD-10-CM

## 2025-02-20 DIAGNOSIS — O99.210 OBESITY AFFECTING PREGNANCY, ANTEPARTUM, UNSPECIFIED OBESITY TYPE (HCC): ICD-10-CM

## 2025-02-20 NOTE — PROGRESS NOTES
Denies pain, bleeding, LOF  Good fetal movement     29 year old  at 34w0d     Heartburn frequently. Tums helps.    Return OB  Pre- Care: UTD.   - EFW today 59%, vertex  - plan for GBS and third tri labs next appt (orders placed today)    Obesity  - start NST next appt    Patient Active Problem List   Diagnosis    Supervision of normal first pregnancy, antepartum (HCC)    Obesity affecting pregnancy, antepartum (HCC)       - Return to clinic in: 2 wks    Ifrah Calero DO

## 2025-03-06 ENCOUNTER — ROUTINE PRENATAL (OUTPATIENT)
Dept: OBGYN CLINIC | Facility: CLINIC | Age: 30
End: 2025-03-06
Payer: COMMERCIAL

## 2025-03-06 ENCOUNTER — PATIENT MESSAGE (OUTPATIENT)
Dept: OBGYN CLINIC | Facility: CLINIC | Age: 30
End: 2025-03-06

## 2025-03-06 ENCOUNTER — LAB ENCOUNTER (OUTPATIENT)
Dept: LAB | Age: 30
End: 2025-03-06
Attending: OBSTETRICS & GYNECOLOGY
Payer: COMMERCIAL

## 2025-03-06 VITALS
WEIGHT: 254 LBS | HEIGHT: 66 IN | DIASTOLIC BLOOD PRESSURE: 74 MMHG | BODY MASS INDEX: 40.82 KG/M2 | SYSTOLIC BLOOD PRESSURE: 108 MMHG

## 2025-03-06 DIAGNOSIS — O99.210 OBESITY AFFECTING PREGNANCY, ANTEPARTUM, UNSPECIFIED OBESITY TYPE (HCC): ICD-10-CM

## 2025-03-06 DIAGNOSIS — Z34.03 ENCOUNTER FOR SUPERVISION OF NORMAL FIRST PREGNANCY IN THIRD TRIMESTER (HCC): ICD-10-CM

## 2025-03-06 DIAGNOSIS — Z34.03 ENCOUNTER FOR SUPERVISION OF NORMAL FIRST PREGNANCY IN THIRD TRIMESTER (HCC): Primary | ICD-10-CM

## 2025-03-06 LAB
BASOPHILS # BLD AUTO: 0.04 X10(3) UL (ref 0–0.2)
BASOPHILS NFR BLD AUTO: 0.4 %
DEPRECATED RDW RBC AUTO: 44 FL (ref 35.1–46.3)
EOSINOPHIL # BLD AUTO: 0.32 X10(3) UL (ref 0–0.7)
EOSINOPHIL NFR BLD AUTO: 3.1 %
ERYTHROCYTE [DISTWIDTH] IN BLOOD BY AUTOMATED COUNT: 13.2 % (ref 11–15)
HCT VFR BLD AUTO: 36.7 %
HGB BLD-MCNC: 12.3 G/DL
IMM GRANULOCYTES # BLD AUTO: 0.18 X10(3) UL (ref 0–1)
IMM GRANULOCYTES NFR BLD: 1.8 %
LYMPHOCYTES # BLD AUTO: 1.45 X10(3) UL (ref 1–4)
LYMPHOCYTES NFR BLD AUTO: 14.2 %
MCH RBC QN AUTO: 30.7 PG (ref 26–34)
MCHC RBC AUTO-ENTMCNC: 33.5 G/DL (ref 31–37)
MCV RBC AUTO: 91.5 FL
MONOCYTES # BLD AUTO: 0.68 X10(3) UL (ref 0.1–1)
MONOCYTES NFR BLD AUTO: 6.6 %
NEUTROPHILS # BLD AUTO: 7.57 X10 (3) UL (ref 1.5–7.7)
NEUTROPHILS # BLD AUTO: 7.57 X10(3) UL (ref 1.5–7.7)
NEUTROPHILS NFR BLD AUTO: 73.9 %
PLATELET # BLD AUTO: 180 10(3)UL (ref 150–450)
RBC # BLD AUTO: 4.01 X10(6)UL
T PALLIDUM AB SER QL IA: NONREACTIVE
WBC # BLD AUTO: 10.2 X10(3) UL (ref 4–11)

## 2025-03-06 PROCEDURE — 85025 COMPLETE CBC W/AUTO DIFF WBC: CPT

## 2025-03-06 PROCEDURE — 87150 DNA/RNA AMPLIFIED PROBE: CPT | Performed by: OBSTETRICS & GYNECOLOGY

## 2025-03-06 PROCEDURE — 87389 HIV-1 AG W/HIV-1&-2 AB AG IA: CPT

## 2025-03-06 PROCEDURE — 86780 TREPONEMA PALLIDUM: CPT

## 2025-03-06 PROCEDURE — 36415 COLL VENOUS BLD VENIPUNCTURE: CPT

## 2025-03-06 PROCEDURE — 87081 CULTURE SCREEN ONLY: CPT | Performed by: OBSTETRICS & GYNECOLOGY

## 2025-03-06 PROCEDURE — 59025 FETAL NON-STRESS TEST: CPT | Performed by: OBSTETRICS & GYNECOLOGY

## 2025-03-06 NOTE — PROGRESS NOTES
Denies pain, bleeding, LOF  Good fetal movement     Cervix closed/50/-2, head very low and cervix very posterior    29 year old  at 36w0d     Return OB  Pre- Care: UTD.   - EFW  59%, vertex  - GBS today collected, third tri labs were drawn today  - briefly discussed upcoming PNC - cervix checks, membrane sweeping, IOL options.    Obesity  - NST reactive today    Patient Active Problem List   Diagnosis    Supervision of normal first pregnancy, antepartum (HCC)    Obesity affecting pregnancy, antepartum (HCC)       - Return to clinic in: 1 wk    Ifrah Calero DO

## 2025-03-08 LAB — GROUP B STREP BY PCR FOR PCR OVT: NEGATIVE

## 2025-03-11 ENCOUNTER — TELEPHONE (OUTPATIENT)
Dept: FAMILY MEDICINE CLINIC | Facility: CLINIC | Age: 30
End: 2025-03-11

## 2025-03-11 NOTE — TELEPHONE ENCOUNTER
Patient sent in [employer] message with a link to short term disability forms. Replied to patient to send actual forms in PDF format. Email created and logged for processing to hold patient space in line.

## 2025-03-13 ENCOUNTER — ROUTINE PRENATAL (OUTPATIENT)
Dept: OBGYN CLINIC | Facility: CLINIC | Age: 30
End: 2025-03-13
Payer: COMMERCIAL

## 2025-03-13 ENCOUNTER — HOSPITAL ENCOUNTER (OUTPATIENT)
Facility: HOSPITAL | Age: 30
Discharge: HOME | End: 2025-03-13
Attending: OBSTETRICS & GYNECOLOGY | Admitting: OBSTETRICS & GYNECOLOGY
Payer: COMMERCIAL

## 2025-03-13 ENCOUNTER — APPOINTMENT (OUTPATIENT)
Dept: ULTRASOUND IMAGING | Facility: HOSPITAL | Age: 30
End: 2025-03-13
Attending: OBSTETRICS & GYNECOLOGY
Payer: COMMERCIAL

## 2025-03-13 VITALS
BODY MASS INDEX: 40.98 KG/M2 | HEIGHT: 66 IN | WEIGHT: 255 LBS | SYSTOLIC BLOOD PRESSURE: 110 MMHG | DIASTOLIC BLOOD PRESSURE: 78 MMHG

## 2025-03-13 VITALS — SYSTOLIC BLOOD PRESSURE: 117 MMHG | DIASTOLIC BLOOD PRESSURE: 80 MMHG | HEART RATE: 86 BPM

## 2025-03-13 DIAGNOSIS — O99.210 OBESITY AFFECTING PREGNANCY, ANTEPARTUM, UNSPECIFIED OBESITY TYPE (HCC): ICD-10-CM

## 2025-03-13 DIAGNOSIS — Z34.00 SUPERVISION OF NORMAL FIRST PREGNANCY, ANTEPARTUM (HCC): Primary | ICD-10-CM

## 2025-03-13 PROCEDURE — 59025 FETAL NON-STRESS TEST: CPT

## 2025-03-13 PROCEDURE — 99214 OFFICE O/P EST MOD 30 MIN: CPT

## 2025-03-13 PROCEDURE — 76819 FETAL BIOPHYS PROFIL W/O NST: CPT | Performed by: OBSTETRICS & GYNECOLOGY

## 2025-03-13 PROCEDURE — 76818 FETAL BIOPHYS PROFILE W/NST: CPT | Performed by: OBSTETRICS & GYNECOLOGY

## 2025-03-13 NOTE — PROGRESS NOTES
Denies pain, bleeding, LOF  Good fetal movement       29 year old  at 37w0d     Return OB  Pre- Care: UTD.     Obesity  - NST today - 2 subtle decels after accels, --> to OB triage for prolonged monitoring / probably BPP    Patient Active Problem List   Diagnosis    Supervision of normal first pregnancy, antepartum (HCC)    Obesity affecting pregnancy, antepartum (HCC)       - Return to clinic in: 1 wk    Ifrah Calero DO

## 2025-03-13 NOTE — PROGRESS NOTES
Pt is a 29 year old female admitted to TR1/TR1-A.     Chief Complaint   Patient presents with    Non-stress Test     Sent for further monitoring due to possible decelerations seen at office NST      Pt is  37w0d intra-uterine pregnancy.  History obtained, consents signed. Oriented to room, staff, and plan of care.

## 2025-03-14 ENCOUNTER — APPOINTMENT (OUTPATIENT)
Dept: OBGYN CLINIC | Facility: HOSPITAL | Age: 30
End: 2025-03-14
Payer: COMMERCIAL

## 2025-03-14 ENCOUNTER — HOSPITAL ENCOUNTER (OUTPATIENT)
Facility: HOSPITAL | Age: 30
Discharge: HOME | End: 2025-03-14
Attending: OBSTETRICS & GYNECOLOGY | Admitting: OBSTETRICS & GYNECOLOGY
Payer: COMMERCIAL

## 2025-03-14 ENCOUNTER — APPOINTMENT (OUTPATIENT)
Dept: ULTRASOUND IMAGING | Facility: HOSPITAL | Age: 30
End: 2025-03-14
Attending: OBSTETRICS & GYNECOLOGY
Payer: COMMERCIAL

## 2025-03-14 VITALS
SYSTOLIC BLOOD PRESSURE: 125 MMHG | DIASTOLIC BLOOD PRESSURE: 78 MMHG | TEMPERATURE: 99 F | RESPIRATION RATE: 17 BRPM | HEART RATE: 98 BPM

## 2025-03-14 PROCEDURE — 99214 OFFICE O/P EST MOD 30 MIN: CPT

## 2025-03-14 PROCEDURE — 76819 FETAL BIOPHYS PROFIL W/O NST: CPT | Performed by: OBSTETRICS & GYNECOLOGY

## 2025-03-14 PROCEDURE — 76818 FETAL BIOPHYS PROFILE W/NST: CPT | Performed by: OBSTETRICS & GYNECOLOGY

## 2025-03-14 PROCEDURE — 59025 FETAL NON-STRESS TEST: CPT

## 2025-03-14 NOTE — PROGRESS NOTES
Pt is a 29 year old female admitted to TR3/TR3-A.     Chief Complaint   Patient presents with    Non-stress Test     Pt here for repeat NST      Pt is  37w1d intra-uterine pregnancy.  History obtained, consents signed. Oriented to room, staff, and plan of care.

## 2025-03-14 NOTE — TRIAGE
[facility]  part of [facility]      Triage Note    Mariana Fierro Patient Status:  Outpatient    1995 MRN [id number]   Location [facility] Attending Maribel Carias MD   Hosp Day # 0 PCP No primary care provider on file.      Para:   Estimated Date of Delivery: 4/3/25  Gestation: 37w0d    Chief Complaint    Non-stress Test         Allergies:  Allergies[1]    No orders of the defined types were placed in this encounter.      Lab Results   Component Value Date    WBC 10.2 2025    HGB 12.3 2025    HCT 36.7 2025    .0 2025       Clinitek UA  Lab Results   Component Value Date    GLUUR Normal 2024    SPECGRAVITY 1.006 2024    URINECUL No Growth at 18-24 hrs. 2024       UA  Lab Results   Component Value Date    COLORUR Colorless (A) 2024    CLARITY Clear 2024    SPECGRAVITY 1.006 2024    PROUR Negative 2024    GLUUR Normal 2024    KETUR Negative 2024    BILUR Negative 2024    BLOODURINE Negative 2024    NITRITE Negative 2024    UROBILINOGEN Normal 2024    LEUUR Negative 2024       Vitals:    25 1703   BP: 117/80   Pulse: 86       NST  Variability: Moderate           Accelerations: Yes           Decelerations: None            Baseline: 130 BPM           Uterine Irritability: No           Contractions: Irregular                                        Acoustic Stimulator: No           Nonstress Test Interpretation: Reactive           Nonstress Test Second Interpretation: Reactive          FHR Category: Category I             Additional Comments       Chief Complaint   Patient presents with    Non-stress Test     Sent for further monitoring due to possible decelerations seen at office NST     Patient sent to triage from office for further evaluation and fetal monitoring. She was sent to ultrasound for a bpp that was . MD notified and  ordered for the patient to to be placed on the monitor again for another NST. The second NST was reactive and the patient had no complaints of painful contractions. MD gave discharge order and instructed patient to return 3/14 for a repeat NST/BPP. Verbal and written instructions given and NST scheduled for tomorrow at 1800. Patient verbalized understanding and was discharged home in stable condition with spouse and belongings.     Eduar MORENO RN  3/13/2025 7:57 PM         [1]   Allergies  Allergen Reactions    Seasonal SHORTNESS OF BREATH and WHEEZING

## 2025-03-15 NOTE — TRIAGE
[facility]  part of [facility]      Triage Note    Mariana Fierro Patient Status:  Outpatient    1995 MRN [id number]   Location [facility] Attending Armando Frazier MD   Hosp Day # 0 PCP No primary care provider on file.      Para:   Estimated Date of Delivery: 4/3/25  Gestation: 37w1d    Chief Complaint    Non-stress Test         Allergies:  Allergies[1]    No orders of the defined types were placed in this encounter.      Lab Results   Component Value Date    WBC 10.2 2025    HGB 12.3 2025    HCT 36.7 2025    .0 2025       Clinitek UA  Lab Results   Component Value Date    GLUUR Normal 2024    SPECGRAVITY 1.006 2024    URINECUL No Growth at 18-24 hrs. 2024       UA  Lab Results   Component Value Date    COLORUR Colorless (A) 2024    CLARITY Clear 2024    SPECGRAVITY 1.006 2024    PROUR Negative 2024    GLUUR Normal 2024    KETUR Negative 2024    BILUR Negative 2024    BLOODURINE Negative 2024    NITRITE Negative 2024    UROBILINOGEN Normal 2024    LEUUR Negative 2024       Vitals:    25 1645   BP: 125/78   BP Location: Left arm   Pulse: 98   Resp: 17   Temp: 98.7 °F (37.1 °C)   TempSrc: Oral       NST  Variability: Moderate           Accelerations: Yes           Decelerations: None            Baseline: 130 BPM           Uterine Irritability: No           Contractions: Not present                                        Acoustic Stimulator: No           Nonstress Test Interpretation: Reactive           Nonstress Test Second Interpretation: Reactive          FHR Category: Category I             Additional Comments   Patient came in for a repeat NST and repeat BPP. Patient reports positive fetal movement. NST reactive and BPP was    called and given report on patient. Discharge order received. Patient given instructions to  follow up in office next week for an OB check and additional NST. Patient agrees with plan of care.    Chief Complaint   Patient presents with    Non-stress Test     Pt here for repeat NST         Gayle FERNANDEZ RN  3/14/2025 8:25 PM         [1]   Allergies  Allergen Reactions    Seasonal SHORTNESS OF BREATH and WHEEZING

## 2025-03-20 ENCOUNTER — ROUTINE PRENATAL (OUTPATIENT)
Dept: OBGYN CLINIC | Facility: CLINIC | Age: 30
End: 2025-03-20
Payer: COMMERCIAL

## 2025-03-20 VITALS
WEIGHT: 256 LBS | HEIGHT: 66 IN | BODY MASS INDEX: 41.14 KG/M2 | SYSTOLIC BLOOD PRESSURE: 114 MMHG | DIASTOLIC BLOOD PRESSURE: 74 MMHG

## 2025-03-20 DIAGNOSIS — O99.210 OBESITY AFFECTING PREGNANCY, ANTEPARTUM, UNSPECIFIED OBESITY TYPE (HCC): ICD-10-CM

## 2025-03-20 DIAGNOSIS — Z34.00 SUPERVISION OF NORMAL FIRST PREGNANCY, ANTEPARTUM (HCC): Primary | ICD-10-CM

## 2025-03-20 PROCEDURE — 59025 FETAL NON-STRESS TEST: CPT | Performed by: OBSTETRICS & GYNECOLOGY

## 2025-03-20 NOTE — TELEPHONE ENCOUNTER
Short term disability forms have been received via [employer]. [employer] message sent for Release of Information.

## 2025-03-20 NOTE — PROGRESS NOTES
Denies pain, bleeding, LOF  Good fetal movement       29 year old  at 38w0d     Return OB  Pre- Care: UTD.     Obesity  - NST today - reactive    Patient Active Problem List   Diagnosis    Supervision of normal first pregnancy, antepartum (HCC)    Obesity affecting pregnancy, antepartum (HCC)       - Return to clinic in: 1 wk    Ifrah Calero DO

## 2025-03-21 NOTE — TELEPHONE ENCOUNTER
Dr. Cisneros,    Please sign off on form if you agree to: disability maternity leave. Start date on or near EZEQUIEL 25-6wks vaginal delivery/8wks .    -Signature page will be the first page scanned  -From your Inbasket, Highlight the patient and click Chart   -Double click the 2025 Forms Completion telephone encounter  -Scroll down to the Media section   -Click the blue Hyperlink: disability Dr Cisneros 25  -Click Acknowledge located in the top right ribbon/menu   -Drag the mouse into the blank space of the document and a + sign will appear. Left click to   electronically sign the document.  -Once signed, simply exit out of the screen and you signature will be saved.     Thank you,  Jackie

## 2025-03-25 ENCOUNTER — TELEPHONE (OUTPATIENT)
Dept: OBGYN CLINIC | Facility: CLINIC | Age: 30
End: 2025-03-25

## 2025-03-25 ENCOUNTER — ROUTINE PRENATAL (OUTPATIENT)
Dept: OBGYN CLINIC | Facility: CLINIC | Age: 30
End: 2025-03-25
Payer: COMMERCIAL

## 2025-03-25 VITALS
SYSTOLIC BLOOD PRESSURE: 118 MMHG | DIASTOLIC BLOOD PRESSURE: 78 MMHG | HEIGHT: 66 IN | WEIGHT: 257 LBS | BODY MASS INDEX: 41.3 KG/M2

## 2025-03-25 DIAGNOSIS — O99.210 OBESITY AFFECTING PREGNANCY, ANTEPARTUM, UNSPECIFIED OBESITY TYPE (HCC): ICD-10-CM

## 2025-03-25 DIAGNOSIS — Z34.00 SUPERVISION OF NORMAL FIRST PREGNANCY, ANTEPARTUM (HCC): Primary | ICD-10-CM

## 2025-03-25 PROCEDURE — 59025 FETAL NON-STRESS TEST: CPT | Performed by: OBSTETRICS & GYNECOLOGY

## 2025-03-25 NOTE — PROGRESS NOTES
Denies pain, bleeding, LOF  Good fetal movement     Cervix: =/-2    29 year old  at 38w5d     Return OB  Pre- Care: UTD.     Obesity  - NST today - reactive    Patient Active Problem List   Diagnosis    Supervision of normal first pregnancy, antepartum (HCC)    Obesity affecting pregnancy, antepartum (HCC)       - Return to clinic in: 1 wk    Ifrah Calero DO

## 2025-03-25 NOTE — TELEPHONE ENCOUNTER
3.31.25 at 9pm    ----- Message from Ifrah Cisneros sent at 3/25/2025 10:08 AM CDT -----  Regarding: pls sched IOL  Hi,  Please schedule cytotec IOL for Mariana on 3/31/25 PM start cytotec. She will be 39w4d, indication is obesity.    Thank you!  ~RD

## 2025-03-27 ENCOUNTER — HOSPITAL ENCOUNTER (OUTPATIENT)
Facility: HOSPITAL | Age: 30
Discharge: HOME | End: 2025-03-27
Attending: OBSTETRICS & GYNECOLOGY | Admitting: OBSTETRICS & GYNECOLOGY
Payer: COMMERCIAL

## 2025-03-27 VITALS
TEMPERATURE: 98 F | SYSTOLIC BLOOD PRESSURE: 118 MMHG | HEART RATE: 91 BPM | BODY MASS INDEX: 41 KG/M2 | DIASTOLIC BLOOD PRESSURE: 85 MMHG | WEIGHT: 257 LBS

## 2025-03-27 PROCEDURE — 99213 OFFICE O/P EST LOW 20 MIN: CPT

## 2025-03-27 PROCEDURE — 59025 FETAL NON-STRESS TEST: CPT

## 2025-03-28 ENCOUNTER — ANESTHESIA (OUTPATIENT)
Dept: OBGYN UNIT | Facility: HOSPITAL | Age: 30
End: 2025-03-28
Payer: COMMERCIAL

## 2025-03-28 ENCOUNTER — HOSPITAL ENCOUNTER (INPATIENT)
Facility: HOSPITAL | Age: 30
LOS: 2 days | Discharge: HOME | End: 2025-03-30
Attending: OBSTETRICS & GYNECOLOGY | Admitting: OBSTETRICS & GYNECOLOGY
Payer: COMMERCIAL

## 2025-03-28 ENCOUNTER — ANESTHESIA EVENT (OUTPATIENT)
Dept: OBGYN UNIT | Facility: HOSPITAL | Age: 30
End: 2025-03-28
Payer: COMMERCIAL

## 2025-03-28 ENCOUNTER — TELEPHONE (OUTPATIENT)
Dept: OBGYN CLINIC | Facility: CLINIC | Age: 30
End: 2025-03-28

## 2025-03-28 PROBLEM — Z34.90 PREGNANCY (HCC): Status: ACTIVE | Noted: 2025-03-28

## 2025-03-28 LAB
ANTIBODY SCREEN: NEGATIVE
BASOPHILS # BLD AUTO: 0.04 X10(3) UL (ref 0–0.2)
BASOPHILS NFR BLD AUTO: 0.3 %
DEPRECATED RDW RBC AUTO: 45.1 FL (ref 35.1–46.3)
EOSINOPHIL # BLD AUTO: 0.18 X10(3) UL (ref 0–0.7)
EOSINOPHIL NFR BLD AUTO: 1.4 %
ERYTHROCYTE [DISTWIDTH] IN BLOOD BY AUTOMATED COUNT: 13.4 % (ref 11–15)
HCT VFR BLD AUTO: 36.4 %
HGB BLD-MCNC: 12.7 G/DL
IMM GRANULOCYTES # BLD AUTO: 0.24 X10(3) UL (ref 0–1)
IMM GRANULOCYTES NFR BLD: 1.9 %
LYMPHOCYTES # BLD AUTO: 1.19 X10(3) UL (ref 1–4)
LYMPHOCYTES NFR BLD AUTO: 9.3 %
MCH RBC QN AUTO: 32.2 PG (ref 26–34)
MCHC RBC AUTO-ENTMCNC: 34.9 G/DL (ref 31–37)
MCV RBC AUTO: 92.2 FL
MONOCYTES # BLD AUTO: 0.59 X10(3) UL (ref 0.1–1)
MONOCYTES NFR BLD AUTO: 4.6 %
NEUTROPHILS # BLD AUTO: 10.49 X10 (3) UL (ref 1.5–7.7)
NEUTROPHILS # BLD AUTO: 10.49 X10(3) UL (ref 1.5–7.7)
NEUTROPHILS NFR BLD AUTO: 82.5 %
PLATELET # BLD AUTO: 155 10(3)UL (ref 150–450)
RBC # BLD AUTO: 3.95 X10(6)UL
RH BLOOD TYPE: POSITIVE
T PALLIDUM AB SER QL IA: NONREACTIVE
WBC # BLD AUTO: 12.7 X10(3) UL (ref 4–11)

## 2025-03-28 PROCEDURE — 0KQM0ZZ REPAIR PERINEUM MUSCLE, OPEN APPROACH: ICD-10-PCS | Performed by: OBSTETRICS & GYNECOLOGY

## 2025-03-28 PROCEDURE — 59400 OBSTETRICAL CARE: CPT | Performed by: OBSTETRICS & GYNECOLOGY

## 2025-03-28 PROCEDURE — 0UQJXZZ REPAIR CLITORIS, EXTERNAL APPROACH: ICD-10-PCS | Performed by: OBSTETRICS & GYNECOLOGY

## 2025-03-28 RX ORDER — BUPIVACAINE HCL/0.9 % NACL/PF 0.25 %
5 PLASTIC BAG, INJECTION (ML) EPIDURAL AS NEEDED
Status: DISCONTINUED | OUTPATIENT
Start: 2025-03-28 | End: 2025-03-30

## 2025-03-28 RX ORDER — ACETAMINOPHEN 500 MG
1000 TABLET ORAL EVERY 6 HOURS PRN
Status: DISCONTINUED | OUTPATIENT
Start: 2025-03-28 | End: 2025-03-28 | Stop reason: HOSPADM

## 2025-03-28 RX ORDER — DEXTROSE, SODIUM CHLORIDE, SODIUM LACTATE, POTASSIUM CHLORIDE, AND CALCIUM CHLORIDE 5; .6; .31; .03; .02 G/100ML; G/100ML; G/100ML; G/100ML; G/100ML
INJECTION, SOLUTION INTRAVENOUS CONTINUOUS
Status: DISCONTINUED | OUTPATIENT
Start: 2025-03-28 | End: 2025-03-28 | Stop reason: HOSPADM

## 2025-03-28 RX ORDER — ACETAMINOPHEN 500 MG
500 TABLET ORAL EVERY 6 HOURS PRN
Status: DISCONTINUED | OUTPATIENT
Start: 2025-03-28 | End: 2025-03-28 | Stop reason: HOSPADM

## 2025-03-28 RX ORDER — BISACODYL 10 MG
10 SUPPOSITORY, RECTAL RECTAL ONCE AS NEEDED
Status: DISCONTINUED | OUTPATIENT
Start: 2025-03-28 | End: 2025-03-30

## 2025-03-28 RX ORDER — SODIUM CHLORIDE, SODIUM LACTATE, POTASSIUM CHLORIDE, CALCIUM CHLORIDE 600; 310; 30; 20 MG/100ML; MG/100ML; MG/100ML; MG/100ML
INJECTION, SOLUTION INTRAVENOUS AS NEEDED
Status: DISCONTINUED | OUTPATIENT
Start: 2025-03-28 | End: 2025-03-28 | Stop reason: HOSPADM

## 2025-03-28 RX ORDER — ONDANSETRON 2 MG/ML
4 INJECTION INTRAMUSCULAR; INTRAVENOUS EVERY 6 HOURS PRN
Status: DISCONTINUED | OUTPATIENT
Start: 2025-03-28 | End: 2025-03-28 | Stop reason: HOSPADM

## 2025-03-28 RX ORDER — LIDOCAINE HYDROCHLORIDE AND EPINEPHRINE 15; 5 MG/ML; UG/ML
INJECTION, SOLUTION EPIDURAL
Status: COMPLETED | OUTPATIENT
Start: 2025-03-28 | End: 2025-03-28

## 2025-03-28 RX ORDER — AMMONIA 15 % (W/V)
0.3 AMPUL (EA) INHALATION AS NEEDED
Status: DISCONTINUED | OUTPATIENT
Start: 2025-03-28 | End: 2025-03-30

## 2025-03-28 RX ORDER — IBUPROFEN 600 MG/1
600 TABLET, FILM COATED ORAL ONCE AS NEEDED
Status: DISCONTINUED | OUTPATIENT
Start: 2025-03-28 | End: 2025-03-28 | Stop reason: HOSPADM

## 2025-03-28 RX ORDER — BUPIVACAINE HYDROCHLORIDE 2.5 MG/ML
20 INJECTION, SOLUTION EPIDURAL; INFILTRATION; INTRACAUDAL; PERINEURAL ONCE
Status: DISCONTINUED | OUTPATIENT
Start: 2025-03-28 | End: 2025-03-28 | Stop reason: HOSPADM

## 2025-03-28 RX ORDER — LIDOCAINE HYDROCHLORIDE 10 MG/ML
30 INJECTION, SOLUTION EPIDURAL; INFILTRATION; INTRACAUDAL; PERINEURAL ONCE
Status: DISCONTINUED | OUTPATIENT
Start: 2025-03-28 | End: 2025-03-28 | Stop reason: HOSPADM

## 2025-03-28 RX ORDER — DOCUSATE SODIUM 100 MG/1
100 CAPSULE, LIQUID FILLED ORAL
Status: DISCONTINUED | OUTPATIENT
Start: 2025-03-28 | End: 2025-03-30

## 2025-03-28 RX ORDER — ACETAMINOPHEN 500 MG
500 TABLET ORAL EVERY 6 HOURS PRN
Status: DISCONTINUED | OUTPATIENT
Start: 2025-03-28 | End: 2025-03-30

## 2025-03-28 RX ORDER — BUPIVACAINE HYDROCHLORIDE 2.5 MG/ML
INJECTION, SOLUTION EPIDURAL; INFILTRATION; INTRACAUDAL; PERINEURAL
Status: COMPLETED | OUTPATIENT
Start: 2025-03-28 | End: 2025-03-28

## 2025-03-28 RX ORDER — CITRIC ACID/SODIUM CITRATE 334-500MG
30 SOLUTION, ORAL ORAL AS NEEDED
Status: DISCONTINUED | OUTPATIENT
Start: 2025-03-28 | End: 2025-03-28 | Stop reason: HOSPADM

## 2025-03-28 RX ORDER — IBUPROFEN 600 MG/1
600 TABLET, FILM COATED ORAL EVERY 6 HOURS
Status: DISCONTINUED | OUTPATIENT
Start: 2025-03-28 | End: 2025-03-30

## 2025-03-28 RX ORDER — LIDOCAINE HYDROCHLORIDE 10 MG/ML
INJECTION, SOLUTION INFILTRATION; PERINEURAL
Status: COMPLETED | OUTPATIENT
Start: 2025-03-28 | End: 2025-03-28

## 2025-03-28 RX ORDER — SIMETHICONE 80 MG
80 TABLET,CHEWABLE ORAL 3 TIMES DAILY PRN
Status: DISCONTINUED | OUTPATIENT
Start: 2025-03-28 | End: 2025-03-30

## 2025-03-28 RX ORDER — ACETAMINOPHEN 500 MG
1000 TABLET ORAL EVERY 6 HOURS PRN
Status: DISCONTINUED | OUTPATIENT
Start: 2025-03-28 | End: 2025-03-30

## 2025-03-28 RX ORDER — NALBUPHINE HYDROCHLORIDE 10 MG/ML
2.5 INJECTION INTRAMUSCULAR; INTRAVENOUS; SUBCUTANEOUS
Status: DISCONTINUED | OUTPATIENT
Start: 2025-03-28 | End: 2025-03-30

## 2025-03-28 RX ORDER — TERBUTALINE SULFATE 1 MG/ML
0.25 INJECTION SUBCUTANEOUS AS NEEDED
Status: DISCONTINUED | OUTPATIENT
Start: 2025-03-28 | End: 2025-03-28 | Stop reason: HOSPADM

## 2025-03-28 RX ADMIN — BUPIVACAINE HYDROCHLORIDE 5 ML: 2.5 INJECTION, SOLUTION EPIDURAL; INFILTRATION; INTRACAUDAL; PERINEURAL at 14:12:00

## 2025-03-28 RX ADMIN — LIDOCAINE HYDROCHLORIDE AND EPINEPHRINE 5 ML: 15; 5 INJECTION, SOLUTION EPIDURAL at 14:09:00

## 2025-03-28 RX ADMIN — LIDOCAINE HYDROCHLORIDE 5 ML: 10 INJECTION, SOLUTION INFILTRATION; PERINEURAL at 14:00:00

## 2025-03-28 RX ADMIN — LIDOCAINE HYDROCHLORIDE AND EPINEPHRINE 5 ML: 15; 5 INJECTION, SOLUTION EPIDURAL at 15:25:00

## 2025-03-28 RX ADMIN — BUPIVACAINE HYDROCHLORIDE 5 ML: 2.5 INJECTION, SOLUTION EPIDURAL; INFILTRATION; INTRACAUDAL; PERINEURAL at 15:09:00

## 2025-03-28 RX ADMIN — LIDOCAINE HYDROCHLORIDE 5 ML: 10 INJECTION, SOLUTION INFILTRATION; PERINEURAL at 15:19:00

## 2025-03-28 RX ADMIN — BUPIVACAINE HYDROCHLORIDE 5 ML: 2.5 INJECTION, SOLUTION EPIDURAL; INFILTRATION; INTRACAUDAL; PERINEURAL at 15:28:00

## 2025-03-28 NOTE — H&P
[facility]  Obstetrics and Gynecology  History & Physical    Mariana Fierro Patient Status:  Inpatient    1995 MRN [id number]   Location [facility] Attending Ifrah Cisneros DO   Hospital Day 0 PCP No primary care provider on file.     CC: Patient is here for labor    SUBJECTIVE:    Mariana Fierro is a 29 year old  female at 39w1d Estimated Date of Delivery: 4/3/25 who is being admitted for labor management. Her current obstetrical history is significant for obesity. Patient reports:    positive UCx.    negative VB.   negative LOF.    positive Fetal movement.   negative Nausea, Vomiting, headache, vision changes and RUQ/Epigastric pain.       EZEQUIEL Confirmation  LMP: Patient's last menstrual period was 2024 (exact date).  EZEQUIEL: 4/3/2025, by Ultrasound       Obstetric History:   OB History    Para Term  AB Living   1             SAB IAB Ectopic Multiple Live Births                  # Outcome Date GA Lbr Kye/2nd Weight Sex Type Anes PTL Lv   1 Current              Past Medical History:   Past Medical History:    Patient denies medical problems     Past Social History:   Past Surgical History:   Procedure Laterality Date    Patient denies any surgical history       Family History:   Family History   Problem Relation Age of Onset    High Cholesterol Father     Hypertension Father     High Cholesterol Mother     Hypertension Mother     Cancer Maternal Grandmother     Diabetes Maternal Grandfather     Thyroid Cancer Paternal Grandmother     Other (Other) Sister         preelampsia    Other (Other) Brother         heart murmur at birth    No Known Problems Brother      Social History:   Social History     Tobacco Use    Smoking status: Never    Smokeless tobacco: Never   Substance Use Topics    Alcohol use: Not Currently       Home Meds: Prescriptions Prior to Admission[1]  Allergies: Allergies[2]    OBJECTIVE:    Temp:  [98 °F (36.7 °C)-98.3 °F  (36.8 °C)] 98 °F (36.7 °C)  Pulse:  [91-93] 93  Resp:  [18] 18  BP: (118-122)/(74-85) 122/74  Body mass index is 41.48 kg/m².    General: AAO. NAD.   Lungs: Respirations non labored   CV: peripheral pulses +2 bilaterally   Abdomen: soft, nontender, nondistended, no abnormal masses, no epigastric pain and FHT present, gravid   Extremities: negative edema bilaterally, negative calf tenderness bilaterally    FHT: moderate variability/120s BPM / Positive accelerations/Negative decelerations   TOCO: q 4 minutes    SVE: 4 / 100 / 0 per RN     Leopolds:  cephalic    Prenatal Labs Brief Review   Blood Type:   Lab Results   Component Value Date    ABO A 2025    RH Positive 2025     GBS:  Negative      Inpatient labs:  Lab Results   Component Value Date    WBC 12.7 2025    HGB 12.7 2025    HCT 36.4 2025    .0 2025       ASSESSMENT/ PLAN:    Mariana Fierro is a 29 year old  female at 39w1d Estimated Date of Delivery: 4/3/25 who is being admitted for labor management.    Patient Active Problem List   Diagnosis    Supervision of normal first pregnancy, antepartum (HCC)    Obesity affecting pregnancy, antepartum (HCC)    Pregnancy (HCC)       1. Labor:   - admit with routine labs  - expectant management  2. Fetal monitoring: CEFM, category 1 FHT  3. GBS: negative  4. Pain: medication when requested    Risks, benefits, alternatives and possible complications have been discussed in detail with the patient.  Pre-admission, admission, and post admission procedures and expectations were discussed in detail.  All questions answered, all appropriate consents signed at the Hospital. Admission is planned for today.     DO [facility]          [1]   Medications Prior to Admission   Medication Sig Dispense Refill Last Dose/Taking    famotidine 20 MG Oral Tab Take 1 tablet (20 mg total) by mouth 2 (two) times daily as needed for Heartburn. 60 tablet 3     aspirin 81 MG Oral Tab  EC Take 1 tablet (81 mg total) by mouth daily.       prenatal vitamin with DHA 27-0.8-228 MG Oral Cap Take 1 capsule by mouth daily.      [2]   Allergies  Allergen Reactions    Seasonal SHORTNESS OF BREATH and WHEEZING

## 2025-03-28 NOTE — ANESTHESIA PREPROCEDURE EVALUATION
Anesthesia PreOp Note    HPI:     Mariana Fierro is a 29 year old female who presents for preoperative consultation requested by: * No surgeons listed *    Date of Surgery: 3/28/2025    * No procedures listed *  Indication: * No pre-op diagnosis entered *    Relevant Problems   No relevant active problems       NPO:                         History Review:  Patient Active Problem List    Diagnosis Date Noted    Pregnancy ([facility]) 03/28/2025    Obesity affecting pregnancy, antepartum ([facility]) 12/23/2024    Supervision of normal first pregnancy, antepartum ([facility]) 10/17/2024       Past Medical History:    Patient denies medical problems       Past Surgical History:   Procedure Laterality Date    Patient denies any surgical history         Prescriptions Prior to Admission[1]  Current Medications and Prescriptions Ordered in Epic[2]    Allergies[3]    Family History   Problem Relation Age of Onset    High Cholesterol Father     Hypertension Father     High Cholesterol Mother     Hypertension Mother     Cancer Maternal Grandmother     Diabetes Maternal Grandfather     Thyroid Cancer Paternal Grandmother     Other (Other) Sister         preelampsia    Other (Other) Brother         heart murmur at birth    No Known Problems Brother      Social History     Socioeconomic History    Marital status: Single   Tobacco Use    Smoking status: Never    Smokeless tobacco: Never   Vaping Use    Vaping status: Never Used   Substance and Sexual Activity    Alcohol use: Not Currently    Drug use: Never    Sexual activity: Yes   Other Topics Concern    Blood Transfusions No       Available pre-op labs reviewed.  Lab Results   Component Value Date    WBC 12.7 (H) 03/28/2025    RBC 3.95 03/28/2025    HGB 12.7 03/28/2025    HCT 36.4 03/28/2025    MCV 92.2 03/28/2025    MCH 32.2 03/28/2025    MCHC 34.9 03/28/2025    RDW 13.4 03/28/2025    .0 03/28/2025             Vital Signs:  Body mass index is 41.48 kg/m².   height is 1.676 m (5' 6\") and  weight is 116.6 kg (257 lb). Her oral temperature is 98 °F (36.7 °C). Her blood pressure is 122/74 and her pulse is 93. Her respiration is 18.   Vitals:    03/28/25 1115 03/28/25 1225   BP: 122/74    Pulse: 93    Resp: 18    Temp: 98 °F (36.7 °C)    TempSrc: Oral    Weight:  116.6 kg (257 lb)   Height:  1.676 m (5' 6\")        Anesthesia Evaluation     Patient summary reviewed and Nursing notes reviewed    No history of anesthetic complications   Airway   Mallampati: III  TM distance: <3 FB  Neck ROM: full  Dental - Dentition appears grossly intact     Pulmonary - negative ROS and normal exam   Cardiovascular - negative ROS and normal exam  Exercise tolerance: good    Neuro/Psych - negative ROS     GI/Hepatic/Renal - negative ROS     Endo/Other - negative ROS   Abdominal                  Anesthesia Plan:   ASA:  3  Emergent    Plan:   Epidural  Post-op Pain Management: Continuous epidural  Plan Comments: Neuraxial anesthesia was explained in detail to the patient, addressing the technique, intended outcome and known adverse events namely spinal or epidural bleeding, infection, post dural puncture headaches, complete spinal block with resulting cardiovascular and respiratory failure, block failure and or need for multiple attempts or switching to general anesthesia.   Informed Consent Plan and Risks Discussed With:  Patient  Discussed plan with:  Attending      I have informed Mariana Fierro and/or legal guardian or family member of the nature of the anesthetic plan, benefits, risks including possible dental damage if relevant, major complications, and any alternative forms of anesthetic management.   All of the patient's questions were answered to the best of my ability. The patient desires the anesthetic management as planned.  Vaishali Potter MD  3/28/2025 1:56 PM  Present on Admission:  **None**           [1]   Medications Prior to Admission   Medication Sig Dispense Refill Last Dose/Taking    famotidine 20 MG Oral  Tab Take 1 tablet (20 mg total) by mouth 2 (two) times daily as needed for Heartburn. 60 tablet 3     aspirin 81 MG Oral Tab EC Take 1 tablet (81 mg total) by mouth daily.       prenatal vitamin with DHA 27-0.8-228 MG Oral Cap Take 1 capsule by mouth daily.      [2]   Current Facility-Administered Medications Ordered in Epic   Medication Dose Route Frequency Provider Last Rate Last Admin    dextrose in lactated ringers 5% infusion   Intravenous Continuous Dubyel, Ifrah T, DO        lactated ringers infusion   [facility]        lactated ringers IV bolus 500 mL  500 mL Intravenous PRN Dubyel, Ifrah T, DO        acetaminophen (Tylenol Extra Strength) tab 500 mg  500 mg Oral Q6H PRN Dubyel, Ifrah T, DO        acetaminophen (Tylenol Extra Strength) tab 1,000 mg  1,000 mg Oral Q6H PRN Dubyel, Ifrah T, DO        ibuprofen (Motrin) tab 600 mg  600 mg Oral Once [facility]N [facility]        ondansetron (Zofran) 4 MG/2ML injection 4 mg  4 mg Intravenous Q6H PRN [facility] Ruth T, DO        oxyTOCIN in sodium chloride 0.9% (Pitocin) 30 Units/500mL infusion premix  62.5-900 eli-units/min Intravenous Continuous Dubyel, Ruth T, DO        terbutaline (Brethine) 1 MG/ML injection 0.25 mg  0.25 mg Subcutaneous PRN Dubyel, Ruth T, DO        sodium citrate-citric acid (Bicitra) 500-334 MG/5ML oral solution 30 mL  30 mL Oral PRN Dubyel, Ruth T, DO        lidocaine PF (Xylocaine-MPF) 1% injection  30 mL Intradermal Once Dubyel, Ifrah T, DO        fentaNYL (Sublimaze) 50 mcg/mL injection 100 mcg  100 mcg Intravenous Once Dubyel, Ifrah T, DO        fentaNYL (Sublimaze) 50 mcg/mL injection 50 mcg  50 mcg Intravenous Q30 Min PRN Dubyel, Ruth T, DO        fentaNYL-bupivacaine 2 mcg/mL-0.125% in sodium chloride 0.9% 100 mL EPIDURAL infusion premix   Epidural Continuous Vaishali Potter MD        fentaNYL (Sublimaze) 50 mcg/mL injection 100 mcg  100 mcg Epidural Once Vaishali Potter MD        bupivacaine PF (Marcaine) 0.25%  injection  20 mL Epidural Once Vaishali Potter MD        ePHEDrine (PF) 25 MG/5 ML injection 5 mg  5 mg Intravenous PRN Vaishali Potter MD        nalbuphine (Nubain) 10 mg/mL injection 2.5 mg  2.5 mg Intravenous Q15 Min PRN Vaishali Potter MD         No current Epic-ordered outpatient medications on file.   [3]   Allergies  Allergen Reactions    Seasonal SHORTNESS OF BREATH and WHEEZING

## 2025-03-28 NOTE — TELEPHONE ENCOUNTER
Called pt, c/o contx since yesterday, reviewed chart seen at [facility] last night VE 2/80/-2.  About an hour ago contx 5 mins apart PS 8/10, c/o yellow discharge leaking but informed not BOW, +FM denies vaginal bleeding. Due to G1 PO at 39.1 wks advised to take hot shower, rest, relax will f/u in an hour.  Pt agrees.    Dr. Cisneros paged  informed.    Called pt informed contx are coming closer, informed to go to [facility], agrees.    Called [facility] Lyn CADE and informed

## 2025-03-28 NOTE — PROGRESS NOTES
Pt is a 29 year old female admitted to TR3/TR3-A.     Chief Complaint   Patient presents with    R/o Labor     Cxs since last night      Pt is  39w1d intra-uterine pregnancy.  History obtained, consents signed. Oriented to room, staff, and plan of care.

## 2025-03-28 NOTE — PROGRESS NOTES
Pt is a 29 year old female admitted to TR1/TR1-A.     Chief Complaint   Patient presents with    R/o Labor     Pt reports feeling more intense and frequent cxts. Has not been timing. Denies VB, LOF      Pt is  39w0d intra-uterine pregnancy.  History obtained, consents signed. Oriented to room, staff, and plan of care.

## 2025-03-28 NOTE — TELEPHONE ENCOUNTER
Incoming call from patient's partner  to inform they are going to the hospital for delivery .    Call was transfer to RN

## 2025-03-28 NOTE — ANESTHESIA PROCEDURE NOTES
Labor Analgesia    Date/Time: 3/28/2025 2:00 PM    Performed by: Vaishali Potter MD  Authorized by: Vaishali Potter MD      General Information and Staff    Start Time:  3/28/2025 2:00 PM  End Time:  3/28/2025 2:09 PM  Anesthesiologist:  Vaishali Potter MD  Performed by:  Anesthesiologist  Patient Location:  OB  Reason for Block: labor epidural    Preanesthetic Checklist: patient identified, IV checked, site marked, risks and benefits discussed, monitors and equipment checked, pre-op evaluation, timeout performed, anesthesia consent and sterile technique used      Procedure Details    Patient Position:  Sitting  Prep: ChloraPrep    Monitoring:  Heart rate  Approach:  Midline    Epidural Needle    Injection Technique:  CEFERINO air  Needle Type:  Tuohy  Needle Gauge:  18 G  Needle Length:  3.5 in  Needle Insertion Depth:  6.5  Location:  L3-4    Spinal Needle      Catheter    Catheter Type:  Multi-orifice  Catheter Size:  20 G  Catheter at Skin Depth:  12  Test Dose:  Negative    Assessment      Additional Comments

## 2025-03-28 NOTE — ANESTHESIA PROCEDURE NOTES
Labor Analgesia    Date/Time: 3/28/2025 3:19 PM    Performed by: Vaishali Potter MD  Authorized by: Vaishali Potter MD      General Information and Staff    Start Time:  3/28/2025 3:19 PM  End Time:  3/28/2025 3:24 PM  Anesthesiologist:  Vaisahli Potter MD  Performed by:  Anesthesiologist  Patient Location:  OB  Reason for Block: labor epidural    Preanesthetic Checklist: patient identified, IV checked, site marked, risks and benefits discussed, monitors and equipment checked, pre-op evaluation, timeout performed, anesthesia consent and sterile technique used      Procedure Details    Patient Position:  Sitting  Prep: ChloraPrep    Monitoring:  Heart rate  Approach:  Midline    Epidural Needle    Injection Technique:  CEFERINO air  Needle Type:  Tuohy  Needle Gauge:  18 G  Needle Length:  3.5 in  Needle Insertion Depth:  6.5  Location:  L2-3    Spinal Needle      Catheter    Catheter Type:  Multi-orifice  Catheter Size:  20 G  Catheter at Skin Depth:  11  Test Dose:  Negative    Assessment      Additional Comments

## 2025-03-29 LAB
BASOPHILS # BLD AUTO: 0.03 X10(3) UL (ref 0–0.2)
BASOPHILS NFR BLD AUTO: 0.2 %
DEPRECATED RDW RBC AUTO: 44.7 FL (ref 35.1–46.3)
EOSINOPHIL # BLD AUTO: 0.09 X10(3) UL (ref 0–0.7)
EOSINOPHIL NFR BLD AUTO: 0.7 %
ERYTHROCYTE [DISTWIDTH] IN BLOOD BY AUTOMATED COUNT: 13.5 % (ref 11–15)
HCT VFR BLD AUTO: 30.7 %
HGB BLD-MCNC: 10.6 G/DL
IMM GRANULOCYTES # BLD AUTO: 0.21 X10(3) UL (ref 0–1)
IMM GRANULOCYTES NFR BLD: 1.6 %
LYMPHOCYTES # BLD AUTO: 1.31 X10(3) UL (ref 1–4)
LYMPHOCYTES NFR BLD AUTO: 9.8 %
MCH RBC QN AUTO: 31.5 PG (ref 26–34)
MCHC RBC AUTO-ENTMCNC: 34.5 G/DL (ref 31–37)
MCV RBC AUTO: 91.4 FL
MONOCYTES # BLD AUTO: 1.19 X10(3) UL (ref 0.1–1)
MONOCYTES NFR BLD AUTO: 8.9 %
NEUTROPHILS # BLD AUTO: 10.51 X10 (3) UL (ref 1.5–7.7)
NEUTROPHILS # BLD AUTO: 10.51 X10(3) UL (ref 1.5–7.7)
NEUTROPHILS NFR BLD AUTO: 78.8 %
PLATELET # BLD AUTO: 136 10(3)UL (ref 150–450)
RBC # BLD AUTO: 3.36 X10(6)UL
WBC # BLD AUTO: 13.3 X10(3) UL (ref 4–11)

## 2025-03-29 NOTE — DISCHARGE INSTRUCTIONS
Post Vaginal Delivery Home Care Instructions     We hope you were pleased with your care at [facility].  We wish you the best outcome and overall experience with the delivery of your baby.  These instructions will help to minimize pain, limit the risk for an infection, and improve the likelihood of a successful recovery.    What to Expect:  Abdominal cramping after delivery especially if you are breastfeeding.   Vaginal bleeding for about 4-6 weeks that may be followed by a yellow or white discharge for a few more weeks.  Your period will resume in approximately 6-8 weeks, unless you are breastfeeding.    If you are bottle feeding, you may notice breast engorgement in about 3 days.  Your breast may be sore and hard. Please wear a tight fitted bra or sports bra for 24-36 hours to help prevent your breast from producing milk, and use ice packs to relive any discomfort.  If you are breastfeeding, nipple dryness is very common the first few days.    Constipation is common after having a baby.  Please increase fluid and fiber in your diet.      Over-The-Counter Medication  Non-prescription anti-inflammatory medications can also help to ease the pain.  You may take Aleve, Tylenol or Ibuprofen   Colace or Metamucil for Constipation  Lanolin for dry nipples  Tucks, Witch Hazel and Epifoam for vaginal/perineum discomfort.   Drink a full glass of water with oral medication and take as directed.    Wound Care  The following instructions will promote proper healing and help to prevent infection  Vaginal/perineum Care: Sitz Bath for 15mins, 2-3 times a day,    Bathing/Showers  You may resume showers  No baths, swimming, hot tubs until your post-partum visit    Home Medication  Resume your home medications as instructed    Diet  Resume your normal diet    Activity  Refrain from vaginal intercourse, vaginal suppositories, tampon use or douches until after your post-partum visit.  No exercising for 4 weeks  You may  climb stairs minimally for the 1st week.    Do not do heavy housework for at least 2-3 weeks    Return to Work or School  You may return to work in approximately 6 weeks  Contact your obstetrician’s office, if you need a medical release. ([phone])    Driving  Avoid driving if you are taking narcotics for pain relief.    Follow-up Appointment with Your Obstetrician  Call your obstetrician’s office today for an appointment in 4-6 weeks.    The number is [phone].  Verify your appointment date, day, time, and location.  At your 1st post-partum office visit:  Your progress will be evaluated, findings reviewed, and any additional concerns and instructions will be discussed.    Questions or Concerns  Call your obstetrician’s office if you experience the following:  Severe pain not controlled by pain medication  Foul smelling vaginal discharge  Heavy bleeding  Shortness of breath  Fever  Redness, increased swelling or drainage from your incision  Crying and periods of sadness that prevents you from caring for yourself and your baby  Burning sensation during urination or inability to urinate  Swelling, redness or abnormal warmth to your leg/calf  Please call ([phone]). If your call is made after office hours, a physician will be available to help you.  There is always a provider covering our patients.    Thank you for coming to [facility] to start your new family.  The nurses and the anesthesiologists try very hard to make sure you receive the best care possible.  Your trust in them as well as us is greatly appreciated.    Thanks so much,   [facility] Obstetrics [facility] Gyne[facility]

## 2025-03-29 NOTE — PROGRESS NOTES
Received patient into room 365 via WC . Bedside shift report received from ALYSSIA Lal. Pt transferred to bed. Bed in lowest and locked position. Side rails up x2. VSS. IV site unremarkable. Baby present in open crib.  ID bands matched with L&D RN.  Patient and family oriented to unit, room and call light within reach.  Safety measures in place, POC followed. Will continue to monitor per protocol.     Advised to call for assistance to bathroom.

## 2025-03-29 NOTE — L&D DELIVERY NOTE
Arnel Fierro [[id number]]      Labor Events     labor?: No   steroids?: None  Antibiotics received during labor?: No  Rupture date/time: 3/28/2025 1513     Rupture type: AROM  Fluid color: Clear  Labor type: Spontaneous Onset of Labor  Augmentation: AROM  Indications for augmentation: Ineffective Contraction Pattern  Intrapartum & labor complications: Late decelerations       Labor Event Times    Labor onset date/time: 3/28/2025 0800  Dilation complete date/time: 3/28/2025 1815  Start pushing date/time: 3/28/2025 182       Ponderosa Presentation    Presentation: Vertex       Operative Delivery    Operative Vaginal Delivery: No                Shoulder Dystocia    Shoulder Dystocia: No       Anesthesia    Method: Epidural               Delivery      Head delivery date/time: 3/28/2025 19:12:57   Delivery date/time:  3/28/25 19:13:06   Delivery type: Normal spontaneous vaginal delivery    Details:     Delivery location: delivery room       Delivery Providers    Delivering Clinician: Ifrah Cisneros DO   Delivery personnel:  Provider Role   Saritha Barnes, RN Baby Nurse   Hali Rausch, ALYSSIA Delivery Nurse   Sarah Segura PCT             Cord    Vessels: 3 Vessels  Complications: None  Timed cord clamping: Yes  Time in sec: 60  Cord blood disposition: to lab  Gases sent?: No       Resuscitation    Method: None       Ponderosa Measurements      Weight: 3590 g 7 lb 14.6 oz Length: 51 cm     Head circum.: 33.5 cm              Placenta    Date/time: 3/28/2025 1915  Removal: Spontaneous  Appearance: Intact       Apgars    Living status: Living   Apgar Scoring Key:    0 1 2    Skin color Blue or pale Acrocyanotic Completely pink    Heart rate Absent <100 bpm >100 bpm    Reflex irritability No response Grimace Cry or active withdrawal    Muscle tone Limp Some flexion Active motion    Respiratory effort Absent Weak cry; hypoventilation Good, crying              1 Minute:  5 Minute:  10 Minute:  15  Minute:  20 Minute:      Skin color: 1  1       Heart rate: 2  2       Reflex irritablity: 2  2       Muscle tone: 2  2       Respiratory effort: 2  2       Total: 9  9          Apgars assigned by: SERGIO CADE  Encinal disposition: with mother       Skin to Skin    Skin to skin initiated date/time: 3/28/2025 1913  Skin to skin with: Mother       Vaginal Count    Initial count RN: Ynes Hurst RN  Initial count Tech: Wilson, Bernice   Sponges   Sharps    Initial counts 10   0    Final counts               Lacerations    Episiotomy: None              [facility]  part of [facility]    Vaginal Delivery Note    Mariana A Fierro Patient Status:  Inpatient    1995 MRN [id number]   Location [facility] Attending Ifrah Cisneros,    Hosp Day # 0 PCP No primary care provider on file.     Delivery     Diagnosis: labor    Labor Details: Spontaneous    Procedure: spontaneous vaginal delivery    Neonatologist Present: no    Placenta  Date/Time of Delivery: 3/28/2025  7:15 PM   Delivery: spontaneous  Placenta to Pathology: no  Cord Gases Submitted: no    Cord Complications: none    Maternal Anesthesia: epidural   Episiotomy/Laceration Repair  Laceration: perineal 2nd degree and anibal-clitoral    Sponge and Needle Counts:  Verified yes    Delivery Complications  none    Hemorrhage?: No, patient is stable, asymptomatic and blood loss is within expected amount following delivery. Standard treatment provided to prevent PPH. Patient does not meet ACOG criteria for hemorrhage at this time.    QBL: Quantitative Blood Loss (mL)   375 mL       Delivery Comments:   The patient was confirmed to be completely dilated. The patients was placed in the dorsolithotomy position.  She was then encouraged to push.  As the head was delivered in ANTONIO position, the perineum was supported to decrease the risk of tearing. The shoulders rotated spontaneously and delivery was completed without complication.   The baby was placed on the mother's abdomen.  The cord was doubly clamped then cut after 60 seconds. IV pitocin bolus was initiated.  The cord blood was sampled. The placenta then delivered intact. The uterus was noted to be firm. Good hemostasis was noted. The perineum, vaginal mucosa and cervix was then examined. A 2nd degree perineal laceration repaired with 2-0 vicryl. The anibal-clitoral laceration was repaired with 3-0 vicryl.  Bleeding was minimal.  The patient was then moved to the supine position in stable condition.  Sponge, needle, and instrument counts were correct.      Ifrah Cisneros DO   3/28/2025  7:35 PM

## 2025-03-29 NOTE — PROGRESS NOTES
Patient up to bathroom with assist x 2.  Voided 400cc. Patient transferred to mother/baby room 365 per wheelchair in stable condition with baby and personal belongings.  Accompanied by significant other and staff.  Report given to mother/baby ALYSSIA Medina.

## 2025-03-29 NOTE — ANESTHESIA POSTPROCEDURE EVALUATION
Patient: Mariana Fierro    Procedure Summary       Date: 03/28/25 Room / Location:     Anesthesia Start: 1400 Anesthesia Stop: 1915    Procedure: LABOR ANALGESIA Diagnosis:     Scheduled Providers:  Anesthesiologist: Vaishali Potter MD    Anesthesia Type: epidural ASA Status: 3 - Emergent            Anesthesia Type: epidural    Vitals Value Taken Time   /73 03/28/25 2015   Temp  03/29/25 0651   Pulse 105 03/28/25 2015   Resp  03/29/25 0651   SpO2 96 % 03/28/25 2015       [facility] AN Post Evaluation:   Patient Evaluated in floor  Patient Participation: complete - patient participated  Level of Consciousness: awake and alert  Pain Score: 0  Pain Management: adequate  Airway Patency:patent  Yes    Nausea/Vomiting: none  Cardiovascular Status: acceptable  Respiratory Status: acceptable  Postoperative Hydration acceptable      Vaishali Potter MD  3/29/2025 6:51 AM

## 2025-03-29 NOTE — PROGRESS NOTES
[facility]  Obstetrics and Gynecology    OB/GYN: Postpartum Progress Note     SUBJECTIVE:  Patient is a 29 year old  female who is s/p  She is PPD# 1.   Doing well. Noted no c/o. Denies fever, chills, N, V, chest pain and SOB. Bleeding has been stable. Voiding without difficulty.  Tolerating general diet. Ambulating without difficulty. .     OBJECTIVE:  Vitals:    25 2130 25 0015 25 0400 25 0838   BP: 127/83 131/76 111/80 120/90   Pulse: 102 94 95 95   Resp: 16 16 15 16   Temp: 98.6 °F (37 °C) 99 °F (37.2 °C) 98.6 °F (37 °C) 98.5 °F (36.9 °C)   TempSrc: Oral Oral Oral Oral   SpO2:       Weight:       Height:           Physical Exam:    General:    alert, appears stated age, and cooperative   Lochia:  appropriate   Uterine Fundus:   firm at umbilicus   DVT Evaluation:  No evidence of DVT seen on physical exam.        Labs:  Recent Labs   Lab 25  0600   RBC 3.36*   HGB 10.6*   HCT 30.7*   MCV 91.4   MCH 31.5   MCHC 34.5   RDW 13.5   NEPRELIM 10.51*   WBC 13.3*   .0*       ASSESSMENT/PLAN:  Patient is a 29 year old  female who is s/p   PPD# 1.     Doing well   Continue routine postpartum care  Vitals per routine   Encourage ambulation   Plan for discharge to home likely tomorrow    MD LEN Uribe OBGYN

## 2025-03-29 NOTE — PLAN OF CARE
Problem: BIRTH - VAGINAL/ SECTION  Goal: Fetal and maternal status remain reassuring during the birth process  Description: INTERVENTIONS:- Monitor vital signs- Monitor fetal heart rate- Monitor uterine activity- Monitor labor progression (vaginal delivery)- DVT prophylaxis (C/S delivery)- Surgical antibiotic prophylaxis (C/S delivery)  Outcome: Completed     Problem: PAIN - ADULT  Goal: Verbalizes/displays adequate comfort level or patient's stated pain goal  Description: INTERVENTIONS:- Encourage pt to monitor pain and request assistance- Assess pain using appropriate pain scale- Administer analgesics based on type and severity of pain and evaluate response- Implement non-pharmacological measures as appropriate and evaluate response- Consider cultural and social influences on pain and pain management- Manage/alleviate anxiety- Utilize distraction and/or relaxation techniques- Monitor for opioid side effects- Notify MD/LIP if interventions unsuccessful or patient reports new pain- Anticipate increased pain with activity and pre-medicate as appropriate  Outcome: Completed     Problem: ANXIETY  Goal: Will report anxiety at manageable levels  Description: INTERVENTIONS:- Administer medication as ordered- Teach and rehearse alternative coping skills- Provide emotional support with 1:1 interaction with staff  Outcome: Completed     Problem: POSTPARTUM  Goal: Long Term Goal:Experiences normal postpartum course  Description: INTERVENTIONS:- Assess and monitor vital signs and lab values.- Assess fundus and lochia.- Provide ice/sitz baths for perineum discomfort.- Monitor healing of incision/episiotomy/laceration, and assess for signs and symptoms of infection and hematoma.- Assess bladder function and monitor for bladder distention.- Provide/instruct/assist with pericare as needed.- Provide VTE prophylaxis as needed.- Monitor bowel function.- Encourage ambulation and provide assistance as needed.- Assess and  monitor emotional status and provide social service/psych resources as needed.- Utilize standard precautions and use personal protective equipment as indicated. Ensure aseptic care of all intravenous lines and invasive tubes/drains.- Obtain immunization and exposure to communicable diseases history.  Outcome: Progressing  Goal: Optimize infant feeding at the breast  Description: INTERVENTIONS:- Initiate breast feeding within first hour after birth. - Monitor effectiveness of current breast feeding efforts.- Assess support systems available to mother/family.- Identify cultural beliefs/practices regarding lactation, letdown techniques, maternal food preferences.- Assess mother's knowledge and previous experience with breast feeding.- Provide information as needed about early infant feeding cues (e.g., rooting, lip smacking, sucking fingers/hand) versus late cue of crying.- Discuss/demonstrate breast feeding aids (e.g., infant sling, nursing footstool/pillows, and breast pumps).- Encourage mother/other family members to express feelings/concerns, and actively listen.- Educate father/SO about benefits of breast feeding and how to manage common lactation challenges.- Recommend avoidance of specific medications or substances incompatible with breast feeding.- Assess and monitor for signs of nipple pain/trauma.- Instruct and provide assistance with proper latch.- Review techniques for milk expression (breast pumping) and storage of breast milk. Provide pumping equipment/supplies, instructions and assistance, as needed.- Encourage rooming-in and breast feeding on demand.- Encourage skin-to-skin contact.- Provide LC support as needed.- Assess for and manage engorgement.- Provide breast feeding education handouts and information on community breast feeding support.   Outcome: Progressing  Goal: Establishment of adequate milk supply with medication/procedure interruptions  Description: INTERVENTIONS:- Review techniques for  milk expression (breast pumping). - Provide pumping equipment/supplies, instructions, and assistance until it is safe to breastfeed infant.  Outcome: Progressing  Goal: Experiences normal breast weaning course  Description: INTERVENTIONS:- Assess for and manage engorgement.- Instruct on breast care.- Provide comfort measures.  Outcome: Progressing  Goal: Appropriate maternal -  bonding  Description: INTERVENTIONS:- Assess caregiver- interactions.- Assess caregiver's emotional status and coping mechanisms.- Encourage caregiver to participate in  daily care.- Assess support systems available to mother/family.- Provide /case management support as needed.  Outcome: Progressing

## 2025-03-30 VITALS
BODY MASS INDEX: 41.3 KG/M2 | HEART RATE: 81 BPM | HEIGHT: 66 IN | SYSTOLIC BLOOD PRESSURE: 121 MMHG | OXYGEN SATURATION: 97 % | DIASTOLIC BLOOD PRESSURE: 78 MMHG | RESPIRATION RATE: 16 BRPM | TEMPERATURE: 98 F | WEIGHT: 257 LBS

## 2025-03-30 RX ORDER — ACETAMINOPHEN 500 MG
1000 TABLET ORAL EVERY 6 HOURS PRN
Qty: 40 TABLET | Refills: 0 | Status: SHIPPED | OUTPATIENT
Start: 2025-03-30

## 2025-03-30 RX ORDER — IBUPROFEN 600 MG/1
600 TABLET, FILM COATED ORAL EVERY 6 HOURS
Qty: 40 TABLET | Refills: 0 | Status: SHIPPED | OUTPATIENT
Start: 2025-03-30

## 2025-03-30 NOTE — DISCHARGE SUMMARY
[facility]  part of [facility]    Discharge Summary    Mariana Fierro Patient Status:  Inpatient    1995 MRN [id number]   Location [facility] 3SE Attending Ifrah Cisneros DO   Hosp Day # 2 PCP No primary care provider on file.     Date of Admission: 3/28/2025    Date of Discharge: 25     Admission Diagnoses: pregnancy  Pregnancy (HCC) at 39w1d     Secondary Diagnosis: obesity in pregnancy     Primary OB Clinician: [facility] Course:     EDC: Estimated Date of Delivery: 4/3/25    Gestational Age: 39w1d    Date of Delivery: 3/28/25    Antepartum complications: obesity    Delivered By:  Dr. Ifrah Cisneros     Delivery Type:     Tubal Ligation: n/a    Baby: Liveborn male,     Apgars:  1 minute:   9                 5 minutes: 9                        10 minutes:      Anesthesia: epidural     Intrapartum Complications: None    Laceration: 2nd degree    Episiotomy: none    Placenta: spontaneous    Feeding Method: bottle fed     Rh Immune Globulin Given: no    Rubella Vaccine Given: no        Discharge Plan:   Discharge Condition: Good  Early Discharge:  NO    Discharge medications:  Current Discharge Medication List        New Orders    Details   acetaminophen 500 MG Oral Tab Take 2 tablets (1,000 mg total) by mouth every 6 (six) hours as needed.      ibuprofen 600 MG Oral Tab Take 1 tablet (600 mg total) by mouth every 6 (six) hours.           Home Meds - Unchanged    Details   famotidine 20 MG Oral Tab Take 1 tablet (20 mg total) by mouth 2 (two) times daily as needed for Heartburn.      prenatal vitamin with DHA 27-0.8-228 MG Oral Cap Take 1 capsule by mouth daily.                   Discharge Diet: As tolerated    Discharge Activity: Pelvic rest until cleared    Follow up:      Follow-up Information       Ifrah Cisneros DO. Schedule an appointment as soon as possible for a visit in 6 week(s).    Specialty: OBSTETRICS & GYNECOLOGY  Why: postpartum  visit  Contact information:   [address] [facility] [address]  [facility] [address]  [phone]               Ifrah Cisneros DO .    Specialty: OBSTETRICS & GYNECOLOGY  Contact information:  [address] [facility] [address]  [facility] [address]  [phone]                             Follow up Labs: None         Other Discharge Instructions:           Post Vaginal Delivery Home Care Instructions     We hope you were pleased with your care at [facility].  We wish you the best outcome and overall experience with the delivery of your baby.  These instructions will help to minimize pain, limit the risk for an infection, and improve the likelihood of a successful recovery.    What to Expect:  Abdominal cramping after delivery especially if you are breastfeeding.   Vaginal bleeding for about 4-6 weeks that may be followed by a yellow or white discharge for a few more weeks.  Your period will resume in approximately 6-8 weeks, unless you are breastfeeding.    If you are bottle feeding, you may notice breast engorgement in about 3 days.  Your breast may be sore and hard. Please wear a tight fitted bra or sports bra for 24-36 hours to help prevent your breast from producing milk, and use ice packs to relive any discomfort.  If you are breastfeeding, nipple dryness is very common the first few days.    Constipation is common after having a baby.  Please increase fluid and fiber in your diet.      Over-The-Counter Medication  Non-prescription anti-inflammatory medications can also help to ease the pain.  You may take Aleve, Tylenol or Ibuprofen   Colace or Metamucil for Constipation  Lanolin for dry nipples  Tucks, Witch Hazel and Epifoam for vaginal/perineum discomfort.   Drink a full glass of water with oral medication and take as directed.    Wound Care  The following instructions will promote proper healing and help to prevent infection  Vaginal/perineum Care: Sitz Bath for 15mins, 2-3 times a day,    Bathing/Showers  You  may resume showers  No baths, swimming, hot tubs until your post-partum visit    Home Medication  Resume your home medications as instructed    Diet  Resume your normal diet    Activity  Refrain from vaginal intercourse, vaginal suppositories, tampon use or douches until after your post-partum visit.  No exercising for 4 weeks  You may climb stairs minimally for the 1st week.    Do not do heavy housework for at least 2-3 weeks    Return to Work or School  You may return to work in approximately 6 weeks  Contact your obstetrician’s office, if you need a medical release. ([phone])    Driving  Avoid driving if you are taking narcotics for pain relief.    Follow-up Appointment with Your Obstetrician  Call your obstetrician’s office today for an appointment in 4-6 weeks.    The number is [phone].  Verify your appointment date, day, time, and location.  At your 1st post-partum office visit:  Your progress will be evaluated, findings reviewed, and any additional concerns and instructions will be discussed.    Questions or Concerns  Call your obstetrician’s office if you experience the following:  Severe pain not controlled by pain medication  Foul smelling vaginal discharge  Heavy bleeding  Shortness of breath  Fever  Redness, increased swelling or drainage from your incision  Crying and periods of sadness that prevents you from caring for yourself and your baby  Burning sensation during urination or inability to urinate  Swelling, redness or abnormal warmth to your leg/calf  Please call ([phone]). If your call is made after office hours, a physician will be available to help you.  There is always a provider covering our patients.    Thank you for coming to [facility] to start your new family.  The nurses and the anesthesiologists try very hard to make sure you receive the best care possible.  Your trust in them as well as us is greatly appreciated.    Thanks so much,   [facility]  Obstetrics and Gynecology          Maribel Carias MD    [facility]

## 2025-03-30 NOTE — PLAN OF CARE
Problem: POSTPARTUM  Goal: Long Term Goal:Experiences normal postpartum course  Description: INTERVENTIONS:- Assess and monitor vital signs and lab values.- Assess fundus and lochia.- Provide ice/sitz baths for perineum discomfort.- Monitor healing of incision/episiotomy/laceration, and assess for signs and symptoms of infection and hematoma.- Assess bladder function and monitor for bladder distention.- Provide/instruct/assist with pericare as needed.- Provide VTE prophylaxis as needed.- Monitor bowel function.- Encourage ambulation and provide assistance as needed.- Assess and monitor emotional status and provide social service/psych resources as needed.- Utilize standard precautions and use personal protective equipment as indicated. Ensure aseptic care of all intravenous lines and invasive tubes/drains.- Obtain immunization and exposure to communicable diseases history.  3/30/2025 1252 by Salima Orozco RN  Outcome: Completed  3/30/2025 0823 by Salima Orozco, RN  Outcome: Progressing  Goal: Optimize infant feeding at the breast  Description: INTERVENTIONS:- Initiate breast feeding within first hour after birth. - Monitor effectiveness of current breast feeding efforts.- Assess support systems available to mother/family.- Identify cultural beliefs/practices regarding lactation, letdown techniques, maternal food preferences.- Assess mother's knowledge and previous experience with breast feeding.- Provide information as needed about early infant feeding cues (e.g., rooting, lip smacking, sucking fingers/hand) versus late cue of crying.- Discuss/demonstrate breast feeding aids (e.g., infant sling, nursing footstool/pillows, and breast pumps).- Encourage mother/other family members to express feelings/concerns, and actively listen.- Educate father/SO about benefits of breast feeding and how to manage common lactation challenges.- Recommend avoidance of specific medications or substances incompatible with breast  feeding.- Assess and monitor for signs of nipple pain/trauma.- Instruct and provide assistance with proper latch.- Review techniques for milk expression (breast pumping) and storage of breast milk. Provide pumping equipment/supplies, instructions and assistance, as needed.- Encourage rooming-in and breast feeding on demand.- Encourage skin-to-skin contact.- Provide LC support as needed.- Assess for and manage engorgement.- Provide breast feeding education handouts and information on community breast feeding support.   3/30/2025 125 by Salima Orozco RN  Outcome: Completed  3/30/2025 0823 by Salima Orozco RN  Outcome: Progressing  Goal: Establishment of adequate milk supply with medication/procedure interruptions  Description: INTERVENTIONS:- Review techniques for milk expression (breast pumping). - Provide pumping equipment/supplies, instructions, and assistance until it is safe to breastfeed infant.  3/30/2025 125 by Salima Orozco RN  Outcome: Completed  3/30/2025 0823 by Salima Orozco RN  Outcome: Progressing  Goal: Experiences normal breast weaning course  Description: INTERVENTIONS:- Assess for and manage engorgement.- Instruct on breast care.- Provide comfort measures.  3/30/2025 125 by Salima Orozco RN  Outcome: Completed  3/30/2025 0823 by Salima Orozco RN  Outcome: Progressing  Goal: Appropriate maternal -  bonding  Description: INTERVENTIONS:- Assess caregiver- interactions.- Assess caregiver's emotional status and coping mechanisms.- Encourage caregiver to participate in  daily care.- Assess support systems available to mother/family.- Provide /case management support as needed.  3/30/2025 1252 by Salima Orozco RN  Outcome: Completed  3/30/2025 0823 by Salima Orozco RN  Outcome: Progressing

## 2025-03-30 NOTE — PROGRESS NOTES
[facility]  Obstetrics and Gynecology    OB/GYN: Postpartum Progress Note     SUBJECTIVE:  Patient is a 29 year old  female who is s/p  She is PPD# 2.   Doing well. Noted no c/o. Denies fever, chills, N, V, chest pain and SOB. Bleeding has been stable. Voiding without difficulty.  Tolerating general diet. Ambulating without difficulty.    OBJECTIVE:  Vitals:    25 0400 25 0838 25 2000 25 0800   BP: 111/80 120/90 120/77 121/78   Pulse: 95 95 93 81   Resp: 15 16 16 16   Temp: 98.6 °F (37 °C) 98.5 °F (36.9 °C) 98.3 °F (36.8 °C) 98.2 °F (36.8 °C)   TempSrc: Oral Oral Oral Oral   SpO2:       Weight:       Height:           Physical Exam:    General:    alert, appears stated age, and cooperative   Lochia:  appropriate   Uterine Fundus:   firm at umbilicus   DVT Evaluation:  No evidence of DVT seen on physical exam.        Labs:  Recent Labs   Lab 25  0600   RBC 3.36*   HGB 10.6*   HCT 30.7*   MCV 91.4   MCH 31.5   MCHC 34.5   RDW 13.5   NEPRELIM 10.51*   WBC 13.3*   .0*       ASSESSMENT/PLAN:  Patient is a 29 year old  female who is s/p   PPD# 2.     Doing well    Plan for discharge to home today, instructions given   Follow up 6 wk postpartum visit    MD LEN rUibe OBGYN

## 2025-03-30 NOTE — PROGRESS NOTES
FOCUS: MOM/BABY DISCHARGE    D: DISCHARGE ORDERS RECEIVED FROM PROVIDERS.    A: POSTPARTUM AND  DISCHARGE AVS'S GIVEN AND REVIEWED EXTENSIVELY, PRESCRIPTIONS/MEDICATIONS REVIEWED, AND DISCHARGE PAPERWORK SIGNED. VOCALIZED UNDERSTANDING. ID BANDS MATCHED. HUGS TAG REMOVED. PATIENT INFORMED WHEN TO MAKE FOLLOW UP APPOINTMENTS WITH PROVIDER AND PEDIATRICIAN.    R: PARENT(S) INTERACTING APPROPRIATELY WITH INFANT. VERBALIZED UNDERSTANDING OF FOLLOW UP INSTRUCTIONS. DISCHARGED IN STABLE CONDITION. INFANT DISCHARGED HOME IN CAR SEAT.

## 2025-04-18 ENCOUNTER — TELEPHONE (OUTPATIENT)
Dept: OBGYN UNIT | Facility: HOSPITAL | Age: 30
End: 2025-04-18

## 2025-05-05 RX ORDER — FAMOTIDINE 20 MG/1
20 TABLET, FILM COATED ORAL 2 TIMES DAILY PRN
Qty: 180 TABLET | Refills: 1 | OUTPATIENT
Start: 2025-05-05

## 2025-05-15 ENCOUNTER — POSTPARTUM (OUTPATIENT)
Dept: OBGYN CLINIC | Facility: CLINIC | Age: 30
End: 2025-05-15
Payer: COMMERCIAL

## 2025-05-15 VITALS
WEIGHT: 233.13 LBS | HEIGHT: 66 IN | BODY MASS INDEX: 37.47 KG/M2 | DIASTOLIC BLOOD PRESSURE: 72 MMHG | SYSTOLIC BLOOD PRESSURE: 120 MMHG

## 2025-05-15 DIAGNOSIS — Z12.4 PAP SMEAR FOR CERVICAL CANCER SCREENING: ICD-10-CM

## 2025-05-15 PROCEDURE — 88175 CYTOPATH C/V AUTO FLUID REDO: CPT | Performed by: OBSTETRICS & GYNECOLOGY

## 2025-05-15 NOTE — PROGRESS NOTES
[facility]  Obstetrics and Gynecology   Postpartum Progress Note    Subjective:     Mariana Fierro is a 30 year old  female who is s/p  on 3/28/25. Her pregnancy was uncomplicated. She reports doing well. Baby is doing well and bottle feeding. The patient reports vagina bleeding resolved. The patient denies emotional concerns.       Bottle feeding    Sleeping well    Bleeding stopped    No problems with voiding  No problems with BM    No sex yet - condoms    Review of Systems:  General:  denies fevers, chills, fatigue and malaise.   Respiratory:  denies SOB, dyspnea, cough or wheezing  Cardiovascular:  denies chest pain, palpitations, exercise intolerance   GI: denies abdominal pain, diarrhea, constipation  :  denies dysuria, hematuria, increased urinary frequency.  denies abnormal uterine bleeding or vaginal discharge.       Objective:     Vitals:    05/15/25 1211   BP: 120/72   Weight: 233 lb 1.6 oz (105.7 kg)   Height: 66\"       Body mass index is 37.62 kg/m².    GENERAL: well developed, well nourished, in no apparent distress, alert and orientated X 3  PSYCH: mood and affect stable   SKIN: no rashes, no lesions  HEENT: normal  LUNGS: respiration unlabored  CARDIOVASCULAR: no peripheral edema or varicosities, skin warm and dry  ABDOMEN: Soft, non distended; non tender, no masses  GYNE/:   External Genitalia: normal, no lesions, good perineal support  Urethra: meatus normal   Bladder: well supported  Vagina: normal mucosa, no lesions, discharge absent, strength 5/5  Uterus: normal size, mobile, nontender  Cervix: normal os, no lesions or bleeding  Adnexa:normal size, bilaterally nontender, no palpable masses  Cul-de-sac: normal  R/V: normal perineum, no hemorrhoids  EXTREMITIES:  nontender without edema        Assessment:     Mariana Fierro is a 30 year old  female who presents for postpartum visit   Problem List[1]      Plan:     Postpartum exam   - doing well  - no  abnormal findings on physical exam   - may return to normal activity   - pap collected today    Contraception counseling   - discussion held with patient about family planning and contraception  - pt desires condoms. Info given for [facility].    All of the findings and plan were discussed with the patient.  She notes understanding and agrees with the plan of care.  All questions were answered to the best of my ability at this time.    RTC in 1 year for well woman exam or sooner if needed     Ifrah Cisneros DO          [1]   Patient Active Problem List  Diagnosis    Supervision of normal first pregnancy, antepartum (HCC)    Obesity affecting pregnancy, antepartum (HCC)    Pregnancy (HCC)    Normal labor and delivery (HCC)

## 2025-05-25 PROBLEM — O28.9 ABNORMAL ANTENATAL TEST: Status: ACTIVE | Noted: 2025-05-25

## 2025-05-25 PROBLEM — O47.9 UTERINE CONTRACTIONS (HCC): Status: ACTIVE | Noted: 2025-05-25

## (undated) NOTE — LETTER
[facility]  Administration of Anesthesia  Mariana GAMBOA agree to be cared for by a physician anesthesiologist alone and/or with a nurse anesthetist, who is specially trained to monitor me and give me medicine to put me to sleep or keep me comfortable during my procedure    I understand that my anesthesiologist and/or anesthetist is not an employee or agent of [facility] or [employer]. He or she works for [facility]    As the patient asking for anesthesia services, I agree to:  Allow the anesthesiologist (anesthesia doctor) to give me medicine and do additional procedures as necessary. Some examples are: Starting or using an “IV” to give me medicine, fluids or blood during my procedure, and having a breathing tube placed to help me breathe when I’m asleep (intubation). In the event that my heart stops working properly, I understand that my anesthesiologist will make every effort to sustain my life, unless otherwise directed by [facility] Do Not Resuscitate documents.  Tell my anesthesia doctor before my procedure:  If I am pregnant.  The last time that I ate or drank.  iii. All of the medicines I take (including prescriptions, herbal supplements, and pills I can buy without a prescription (including street drugs/illegal medications). Failure to inform my anesthesiologist about these medicines may increase my risk of anesthetic complications.  iv.If I am allergic to anything or have had a reaction to anesthesia before.  I understand how the anesthesia medicine will help me (benefits).  I understand that with any type of anesthesia medicine there are risks:  The most common risks are: nausea, vomiting, sore throat, muscle soreness, damage to my eyes, mouth, or teeth (from breathing tube placement).  Rare risks include: remembering what happened during my procedure, allergic reactions to medications, injury to my airway, heart, lungs, vision, nerves, or  muscles and in extremely rare instances death.  My doctor has explained to me other choices available to me for my care (alternatives).  Pregnant Patients (“epidural”):  I understand that the risks of having an epidural (medicine given into my back to help control pain during labor), include itching, low blood pressure, difficulty urinating, headache or slowing of the baby’s heart. Very rare risks include infection, bleeding, seizure, irregular heart rhythms and nerve injury.  Regional Anesthesia (“spinal”, “epidural”, & “nerve blocks”):  I understand that rare but potential complications include headache, bleeding, infection, seizure, irregular heart rhythms, and nerve injury.    _____________________________________________________________________________  Patient (or Representative) Signature/Relationship to Patient  Date   Time    _____________________________________________________________________________   Name (if used)    Language/Organization   Time    _____________________________________________________________________________  Nurse Anesthetist Signature     Date   Time  _____________________________________________________________________________  Anesthesiologist Signature     Date   Time  I have discussed the procedure and information above with the patient (or patient’s representative) and answered their questions. The patient or their representative has agreed to have anesthesia services.    _____________________________________________________________________________  Witness        Date   Time  I have verified that the signature is that of the patient or patient’s representative, and that it was signed before the procedure  Patient Name: Mariana Fierro     : 1995                 Printed: 3/28/2025 at 11:32 AM    Medical Record #: [id number]                                            Page 1 of 1  ----------ANESTHESIA CONSENT----------

## (undated) NOTE — LETTER
03/06/25    To Whom It May Concern,    Mariana Fierro is currently pregnant with EZEQUIEL of 4/3/25 and under our care.     I recommend she work from home beginning in her 37th week (3/14/25).    Please call me with any questions or concerns.    Sincerely,      Ifrah Cisneros, DO